# Patient Record
Sex: FEMALE | Race: WHITE
[De-identification: names, ages, dates, MRNs, and addresses within clinical notes are randomized per-mention and may not be internally consistent; named-entity substitution may affect disease eponyms.]

---

## 2017-05-25 PROBLEM — Z00.00 ENCOUNTER FOR PREVENTIVE HEALTH EXAMINATION: Status: ACTIVE | Noted: 2017-05-25

## 2017-06-23 ENCOUNTER — APPOINTMENT (OUTPATIENT)
Dept: NEUROLOGY | Facility: CLINIC | Age: 82
End: 2017-06-23

## 2017-06-23 VITALS — HEART RATE: 80 BPM | SYSTOLIC BLOOD PRESSURE: 194 MMHG | DIASTOLIC BLOOD PRESSURE: 81 MMHG

## 2017-06-23 DIAGNOSIS — I10 ESSENTIAL (PRIMARY) HYPERTENSION: ICD-10-CM

## 2017-06-23 DIAGNOSIS — Z82.0 FAMILY HISTORY OF EPILEPSY AND OTHER DISEASES OF THE NERVOUS SYSTEM: ICD-10-CM

## 2017-06-23 RX ORDER — ATENOLOL 50 MG/1
50 TABLET ORAL
Qty: 180 | Refills: 0 | Status: ACTIVE | COMMUNITY
Start: 2017-02-22

## 2017-06-23 RX ORDER — ALPRAZOLAM 1 MG/1
1 TABLET ORAL
Qty: 120 | Refills: 0 | Status: ACTIVE | COMMUNITY
Start: 2017-05-31

## 2017-06-23 RX ORDER — DICLOFENAC SODIUM 10 MG/G
1 GEL TOPICAL
Qty: 100 | Refills: 0 | Status: ACTIVE | COMMUNITY
Start: 2017-05-10

## 2017-09-22 ENCOUNTER — MEDICATION RENEWAL (OUTPATIENT)
Age: 82
End: 2017-09-22

## 2017-10-17 ENCOUNTER — APPOINTMENT (OUTPATIENT)
Dept: NEUROLOGY | Facility: CLINIC | Age: 82
End: 2017-10-17
Payer: MEDICARE

## 2017-10-17 VITALS
SYSTOLIC BLOOD PRESSURE: 181 MMHG | WEIGHT: 148 LBS | HEIGHT: 64 IN | BODY MASS INDEX: 25.27 KG/M2 | DIASTOLIC BLOOD PRESSURE: 84 MMHG | HEART RATE: 72 BPM

## 2017-10-17 DIAGNOSIS — R39.15 URGENCY OF URINATION: ICD-10-CM

## 2017-10-17 PROCEDURE — 99214 OFFICE O/P EST MOD 30 MIN: CPT

## 2017-10-17 RX ORDER — TROSPIUM CHLORIDE 20 MG/1
20 TABLET, FILM COATED ORAL TWICE DAILY
Qty: 60 | Refills: 5 | Status: ACTIVE | COMMUNITY
Start: 2017-10-17 | End: 1900-01-01

## 2017-11-03 ENCOUNTER — MEDICATION RENEWAL (OUTPATIENT)
Age: 82
End: 2017-11-03

## 2017-11-13 ENCOUNTER — MEDICATION RENEWAL (OUTPATIENT)
Age: 82
End: 2017-11-13

## 2018-03-16 ENCOUNTER — APPOINTMENT (OUTPATIENT)
Dept: NEUROLOGY | Facility: CLINIC | Age: 83
End: 2018-03-16
Payer: MEDICARE

## 2018-03-16 VITALS
HEIGHT: 64 IN | DIASTOLIC BLOOD PRESSURE: 82 MMHG | HEART RATE: 72 BPM | BODY MASS INDEX: 24.92 KG/M2 | WEIGHT: 146 LBS | SYSTOLIC BLOOD PRESSURE: 180 MMHG

## 2018-03-16 PROCEDURE — 99214 OFFICE O/P EST MOD 30 MIN: CPT

## 2018-09-21 ENCOUNTER — APPOINTMENT (OUTPATIENT)
Dept: NEUROLOGY | Facility: CLINIC | Age: 83
End: 2018-09-21
Payer: MEDICARE

## 2018-09-21 PROCEDURE — 99214 OFFICE O/P EST MOD 30 MIN: CPT

## 2019-03-27 ENCOUNTER — APPOINTMENT (OUTPATIENT)
Dept: NEUROLOGY | Facility: CLINIC | Age: 84
End: 2019-03-27
Payer: MEDICARE

## 2019-03-27 PROCEDURE — 99214 OFFICE O/P EST MOD 30 MIN: CPT

## 2019-03-27 NOTE — PHYSICAL EXAM
[Person] : oriented to person [Place] : oriented to place [Time] : oriented to time [Naming Objects] : no difficulty naming common objects [Repeating Phrases] : no difficulty repeating a phrase [Writing A Sentence] : no difficulty writing a sentence [Fluency] : fluency intact [Comprehension] : comprehension intact [Reading] : reading intact [Cranial Nerves Optic (II)] : visual acuity intact bilaterally,  visual fields full to confrontation, pupils equal round and reactive to light [Cranial Nerves Oculomotor (III)] : extraocular motion intact [Cranial Nerves Trigeminal (V)] : facial sensation intact symmetrically [Cranial Nerves Facial (VII)] : face symmetrical [Cranial Nerves Vestibulocochlear (VIII)] : hearing was intact bilaterally [Cranial Nerves Glossopharyngeal (IX)] : tongue and palate midline [Cranial Nerves Accessory (XI - Cranial And Spinal)] : head turning and shoulder shrug symmetric [Cranial Nerves Hypoglossal (XII)] : there was no tongue deviation with protrusion [Motor Strength] : muscle strength was normal in all four extremities [Motor Handedness Right-Handed] : the patient is right hand dominant [Sensation Tactile Decrease] : light touch was intact [Sensation Vibration Decrease] : vibration was intact [Proprioception] : proprioception was intact [Dysdiadochokinesia Bilaterally] : not present [Coordination - Dysmetria Impaired Finger-to-Nose Bilateral] : not present [Coordination - Dysmetria Impaired Heel-to-Shin Bilateral] : not present [2+] : Brachioradialis left 2+ [1+] : Ankle jerk left 1+ [Plantar Reflex Right Only] : normal on the right [Plantar Reflex Left Only] : normal on the left [FreeTextEntry1] : Facial expression was mildly reduced, but voice was normal. Extraocular movements were intact except mildly impaired upgaze with normal saccade, smooth pursuit, and no square wave jerks seen. Tone was mildly increased neck and minimally (in left arm). Rapid alternating movements and foot tap were slightly worse on the left. 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 Cannot standup steadily without pushing with arms. Gait is wide-based with shortened stride and (improved) freezing of gait, especially on turns. Turns from multistep. Pull test negative. There was no tremor.

## 2019-03-27 NOTE — HISTORY OF PRESENT ILLNESS
[FreeTextEntry1] : The patient is an 81-year-old right-handed woman who comes in to be evaluated for falling and gait hesitation. She has fallen 5 times since September of 2016. Twice in September, but when she fell backwards, twice at night  in February when she was taking Xanax, and once recently when she was trying to sit on the toilet quickly and misjudged the seat.\par She has no speech changes, but does have decreased facial expression. She has no drooling or dysphagia. Her handwriting has declined.  She has no trouble turning over in bed, no history of acting out her dreams. Walking and balance have gotten worse. She is mildly forgetful, and has no depression or hallucinations. She has constipation, which she treats with Metamucil and Colace. Her sense of smell is decreased. She was evaluated last year by a movement disorders specialist (Dr Anson Swenson). At that time, an MRI was recommended, which showed mild white matter disease, but no hydrocephalus. A Eugene was discussed, but never performed. \par \par 1. Had heart attack in October 2018, got stent\par 2. Did start sinemet 2.5 tabs tid and felt fatigued so went down, now back at 2.5 tabs. She feels about the same as last visit, except more hesitation and retropulsion. Right knee hurts today. 2 falls at home and one in parking lot.\par 2. Urinary issues persist. Saw Urologist started oxybutinin which gave her too many side affects. No current meds.\par 3. Sleeps well, no RBD. Wakes up 2x/night to urinate\par 4. Exercises and gets PT ([facility] in [place], she likes it)\par \par \par \par \par \par \par

## 2019-03-27 NOTE — DISCUSSION/SUMMARY
[FreeTextEntry1] : In summary, the patient is a 81-year-old right-handed woman with a gait disorder of unknown etiology. Examination is significant for mild symmetric rigidity and perhaps slowing (which was slightly asymmetric), and significant issues with balance, walking, and freezing of gait. She had no tremor.\par Overall, the history and examination is not entirely consistent with a diagnosis of idiopathic Parkinson's disease. Likewise, neither the exam or the imaging suggest vascular parkinsonism or normal pressure hydrocephalus. Seems to be primary freezing of gait. She is better on levodopa\par \par 1. Continue sinemet, 2.5 pills 25/100 TID, trial of 25/250, if she has more side effects from lower carbidopa, can go back to 25/100\par 2. PT again, OT\par

## 2019-06-21 ENCOUNTER — MEDICATION RENEWAL (OUTPATIENT)
Age: 84
End: 2019-06-21

## 2019-07-26 ENCOUNTER — APPOINTMENT (OUTPATIENT)
Dept: NEUROLOGY | Facility: CLINIC | Age: 84
End: 2019-07-26
Payer: MEDICARE

## 2019-07-26 VITALS — DIASTOLIC BLOOD PRESSURE: 74 MMHG | SYSTOLIC BLOOD PRESSURE: 160 MMHG

## 2019-07-26 DIAGNOSIS — G20 PARKINSON'S DISEASE: ICD-10-CM

## 2019-07-26 DIAGNOSIS — R26.89 OTHER ABNORMALITIES OF GAIT AND MOBILITY: ICD-10-CM

## 2019-07-26 PROCEDURE — 99214 OFFICE O/P EST MOD 30 MIN: CPT

## 2019-07-26 NOTE — PHYSICAL EXAM
[Person] : oriented to person [Time] : oriented to time [Place] : oriented to place [Naming Objects] : no difficulty naming common objects [Fluency] : fluency intact [Cranial Nerves Optic (II)] : visual acuity intact bilaterally,  visual fields full to confrontation, pupils equal round and reactive to light [Cranial Nerves Facial (VII)] : face symmetrical [Cranial Nerves Oculomotor (III)] : extraocular motion intact [Cranial Nerves Trigeminal (V)] : facial sensation intact symmetrically [Cranial Nerves Glossopharyngeal (IX)] : tongue and palate midline [Cranial Nerves Accessory (XI - Cranial And Spinal)] : head turning and shoulder shrug symmetric [Cranial Nerves Vestibulocochlear (VIII)] : hearing was intact bilaterally [Cranial Nerves Hypoglossal (XII)] : there was no tongue deviation with protrusion [Motor Strength] : muscle strength was normal in all four extremities [Motor Handedness Right-Handed] : the patient is right hand dominant [Sensation Tactile Decrease] : light touch was intact [Proprioception] : proprioception was intact [Sensation Vibration Decrease] : vibration was intact [2+] : Brachioradialis left 2+ [1+] : Ankle jerk right 1+ [Short Term Intact] : short term memory intact [Registration Intact] : recent registration memory intact [Dysdiadochokinesia Bilaterally] : not present [Plantar Reflex Right Only] : normal on the right [Coordination - Dysmetria Impaired Heel-to-Shin Bilateral] : not present [Coordination - Dysmetria Impaired Finger-to-Nose Bilateral] : not present [FreeTextEntry1] : Facial expression was mildly reduced, but voice was normal. Extraocular movements were intact except mildly impaired upgaze with normal saccade, smooth pursuit, and no square wave jerks seen. Tone was mildly increased neck and minimally (in left arm). Rapid alternating movements and foot tap were slightly worse on the left. 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 Cannot standup steadily without pushing with arms. Gait is wide-based with shortened stride and (improved) freezing of gait, especially on turns. Turns from multistep. Pull test positive. There was no tremor. [Plantar Reflex Left Only] : normal on the left

## 2019-07-26 NOTE — DISCUSSION/SUMMARY
[FreeTextEntry1] : In summary, the patient is a 81-year-old right-handed woman with a gait disorder of unknown etiology. Examination is significant for mild symmetric rigidity and perhaps slowing (which was slightly asymmetric), and significant issues with balance, walking, and freezing of gait. She had no tremor.\par Overall, the history and examination is not entirely consistent with a diagnosis of idiopathic Parkinson's disease. Likewise, neither the exam or the imaging suggest vascular parkinsonism or normal pressure hydrocephalus. Seems to be primary freezing of gait. She is better on levodopa\par \par 1. Continue sinemet, 2.5 pills 25/100 TID, can try to dose as 2 qid (11-3-7-11)\par 2. PT again, OT\par

## 2019-07-26 NOTE — HISTORY OF PRESENT ILLNESS
[FreeTextEntry1] : The patient is an 83-year-old right-handed woman with parkinsonism and freezing of gait. Also had heart attack in October 2018, got stent\par \par 1. Pulled muscle in right back Did start sinemet 2.5 tabs tid and felt fatigued so went down, now back at 2.5 tabs (11-5-11). Tried 25/250, felt worse, went back to 25/100. She feels about the same as last visit, except more hesitation and freezing and retropulsion.\par 2. Urinary issues persist. Saw Urologist started oxybutynin which gave her too many side affects. No current meds.\par 3. Sleeps well with xanax, no RBD. Wakes up 2x/night to urinate. Mood is good, memory ok. \par 4. Exercises and gets PT ([facility] in [place], she likes it)\par \par \par \par \par \par \par

## 2019-08-22 ENCOUNTER — MEDICATION RENEWAL (OUTPATIENT)
Age: 84
End: 2019-08-22

## 2019-10-09 ENCOUNTER — APPOINTMENT (OUTPATIENT)
Dept: NEUROLOGY | Facility: CLINIC | Age: 84
End: 2019-10-09

## 2019-10-15 RX ORDER — RASAGILINE 1 MG/1
1 TABLET ORAL
Qty: 30 | Refills: 5 | Status: ACTIVE | COMMUNITY
Start: 2019-10-15 | End: 1900-01-01

## 2020-02-12 ENCOUNTER — APPOINTMENT (OUTPATIENT)
Dept: NEUROLOGY | Facility: CLINIC | Age: 85
End: 2020-02-12

## 2020-04-01 ENCOUNTER — APPOINTMENT (OUTPATIENT)
Dept: NEUROLOGY | Facility: CLINIC | Age: 85
End: 2020-04-01
Payer: MEDICARE

## 2020-04-01 ENCOUNTER — APPOINTMENT (OUTPATIENT)
Dept: NEUROLOGY | Facility: CLINIC | Age: 85
End: 2020-04-01

## 2020-04-01 PROCEDURE — G2012 BRIEF CHECK IN BY MD/QHP: CPT

## 2020-08-11 RX ORDER — CARBIDOPA AND LEVODOPA 25; 100 MG/1; MG/1
25-100 TABLET ORAL
Qty: 225 | Refills: 5 | Status: ACTIVE | COMMUNITY
Start: 2017-06-23 | End: 1900-01-01

## 2021-11-28 ENCOUNTER — EMERGENCY (EMERGENCY)
Facility: HOSPITAL | Age: 86
LOS: 0 days | Discharge: HOME | End: 2021-11-28
Attending: EMERGENCY MEDICINE
Payer: MEDICARE

## 2021-11-28 VITALS
RESPIRATION RATE: 18 BRPM | HEART RATE: 83 BPM | WEIGHT: 149.91 LBS | OXYGEN SATURATION: 99 % | TEMPERATURE: 98 F | HEIGHT: 63 IN | SYSTOLIC BLOOD PRESSURE: 157 MMHG | DIASTOLIC BLOOD PRESSURE: 71 MMHG

## 2021-11-28 VITALS
SYSTOLIC BLOOD PRESSURE: 148 MMHG | OXYGEN SATURATION: 99 % | TEMPERATURE: 99 F | DIASTOLIC BLOOD PRESSURE: 63 MMHG | RESPIRATION RATE: 14 BRPM | HEART RATE: 64 BPM
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
## 2021-11-28 DIAGNOSIS — Z86.2 PERSONAL HISTORY OF DISEASES OF THE BLOOD AND BLOOD-FORMING ORGANS AND CERTAIN DISORDERS INVOLVING THE IMMUNE MECHANISM: ICD-10-CM

## 2021-11-28 DIAGNOSIS — Z79.01 LONG TERM (CURRENT) USE OF ANTICOAGULANTS: ICD-10-CM

## 2021-11-28 DIAGNOSIS — G20 PARKINSON'S DISEASE: ICD-10-CM

## 2021-11-28 DIAGNOSIS — I25.10 ATHEROSCLEROTIC HEART DISEASE OF NATIVE CORONARY ARTERY WITHOUT ANGINA PECTORIS: ICD-10-CM

## 2021-11-28 DIAGNOSIS — I10 ESSENTIAL (PRIMARY) HYPERTENSION: ICD-10-CM

## 2021-11-28 DIAGNOSIS — S09.90XA UNSPECIFIED INJURY OF HEAD, INITIAL ENCOUNTER: ICD-10-CM

## 2021-11-28 DIAGNOSIS — E87.1 HYPO-OSMOLALITY AND HYPONATREMIA: ICD-10-CM
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
## 2021-11-28 LAB
ALBUMIN SERPL ELPH-MCNC: 3.4 G/DL — SIGNIFICANT CHANGE UP (ref 3.3–5)
ALP SERPL-CCNC: 79 U/L — SIGNIFICANT CHANGE UP (ref 40–120)
ALT FLD-CCNC: 10 U/L — LOW (ref 12–78)
ANION GAP SERPL CALC-SCNC: 6 MMOL/L — SIGNIFICANT CHANGE UP (ref 5–17)
APPEARANCE UR: CLEAR — SIGNIFICANT CHANGE UP
APTT BLD: 27.5 SEC — SIGNIFICANT CHANGE UP (ref 27.5–35.5)
AST SERPL-CCNC: 16 U/L — SIGNIFICANT CHANGE UP (ref 15–37)
BASOPHILS # BLD AUTO: 0.07 K/UL — SIGNIFICANT CHANGE UP (ref 0–0.2)
BASOPHILS NFR BLD AUTO: 0.6 % — SIGNIFICANT CHANGE UP (ref 0–2)
BILIRUB SERPL-MCNC: 0.5 MG/DL — SIGNIFICANT CHANGE UP (ref 0.2–1.2)
BILIRUB UR-MCNC: NEGATIVE — SIGNIFICANT CHANGE UP
BUN SERPL-MCNC: 15 MG/DL — SIGNIFICANT CHANGE UP (ref 7–23)
CALCIUM SERPL-MCNC: 9 MG/DL — SIGNIFICANT CHANGE UP (ref 8.5–10.1)
CHLORIDE SERPL-SCNC: 94 MMOL/L — LOW (ref 96–108)
CO2 SERPL-SCNC: 31 MMOL/L — SIGNIFICANT CHANGE UP (ref 22–31)
COLOR SPEC: YELLOW — SIGNIFICANT CHANGE UP
CREAT SERPL-MCNC: 0.54 MG/DL — SIGNIFICANT CHANGE UP (ref 0.5–1.3)
DIFF PNL FLD: ABNORMAL
EOSINOPHIL # BLD AUTO: 0.1 K/UL — SIGNIFICANT CHANGE UP (ref 0–0.5)
EOSINOPHIL NFR BLD AUTO: 0.8 % — SIGNIFICANT CHANGE UP (ref 0–6)
GLUCOSE SERPL-MCNC: 93 MG/DL — SIGNIFICANT CHANGE UP (ref 70–99)
GLUCOSE UR QL: 50 MG/DL
HCT VFR BLD CALC: 39.7 % — SIGNIFICANT CHANGE UP (ref 34.5–45)
HGB BLD-MCNC: 13.7 G/DL — SIGNIFICANT CHANGE UP (ref 11.5–15.5)
IMM GRANULOCYTES NFR BLD AUTO: 0.5 % — SIGNIFICANT CHANGE UP (ref 0–1.5)
INR BLD: 1.01 RATIO — SIGNIFICANT CHANGE UP (ref 0.88–1.16)
KETONES UR-MCNC: ABNORMAL
LEUKOCYTE ESTERASE UR-ACNC: NEGATIVE — SIGNIFICANT CHANGE UP
LYMPHOCYTES # BLD AUTO: 1.24 K/UL — SIGNIFICANT CHANGE UP (ref 1–3.3)
LYMPHOCYTES # BLD AUTO: 9.9 % — LOW (ref 13–44)
MCHC RBC-ENTMCNC: 31 PG — SIGNIFICANT CHANGE UP (ref 27–34)
MCHC RBC-ENTMCNC: 34.5 GM/DL — SIGNIFICANT CHANGE UP (ref 32–36)
MCV RBC AUTO: 89.8 FL — SIGNIFICANT CHANGE UP (ref 80–100)
MONOCYTES # BLD AUTO: 0.98 K/UL — HIGH (ref 0–0.9)
MONOCYTES NFR BLD AUTO: 7.8 % — SIGNIFICANT CHANGE UP (ref 2–14)
NEUTROPHILS # BLD AUTO: 10.11 K/UL — HIGH (ref 1.8–7.4)
NEUTROPHILS NFR BLD AUTO: 80.4 % — HIGH (ref 43–77)
NITRITE UR-MCNC: NEGATIVE — SIGNIFICANT CHANGE UP
PH UR: 7 — SIGNIFICANT CHANGE UP (ref 5–8)
PLATELET # BLD AUTO: 258 K/UL — SIGNIFICANT CHANGE UP (ref 150–400)
POTASSIUM SERPL-MCNC: 3.6 MMOL/L — SIGNIFICANT CHANGE UP (ref 3.5–5.3)
POTASSIUM SERPL-SCNC: 3.6 MMOL/L — SIGNIFICANT CHANGE UP (ref 3.5–5.3)
PROT SERPL-MCNC: 6.7 GM/DL — SIGNIFICANT CHANGE UP (ref 6–8.3)
PROT UR-MCNC: 15 MG/DL
PROTHROM AB SERPL-ACNC: 11.8 SEC — SIGNIFICANT CHANGE UP (ref 10.6–13.6)
RBC # BLD: 4.42 M/UL — SIGNIFICANT CHANGE UP (ref 3.8–5.2)
RBC # FLD: 13.5 % — SIGNIFICANT CHANGE UP (ref 10.3–14.5)
SODIUM SERPL-SCNC: 131 MMOL/L — LOW (ref 135–145)
SP GR SPEC: 1.01 — SIGNIFICANT CHANGE UP (ref 1.01–1.02)
UROBILINOGEN FLD QL: NEGATIVE MG/DL — SIGNIFICANT CHANGE UP
WBC # BLD: 12.56 K/UL — HIGH (ref 3.8–10.5)
WBC # FLD AUTO: 12.56 K/UL — HIGH (ref 3.8–10.5)

## 2021-11-28 PROCEDURE — 99284 EMERGENCY DEPT VISIT MOD MDM: CPT | Mod: 25

## 2021-11-28 PROCEDURE — 80053 COMPREHEN METABOLIC PANEL: CPT

## 2021-11-28 PROCEDURE — 36415 COLL VENOUS BLD VENIPUNCTURE: CPT

## 2021-11-28 PROCEDURE — 70450 CT HEAD/BRAIN W/O DYE: CPT | Mod: MA

## 2021-11-28 PROCEDURE — 72125 CT NECK SPINE W/O DYE: CPT | Mod: MA

## 2021-11-28 PROCEDURE — 86901 BLOOD TYPING SEROLOGIC RH(D): CPT

## 2021-11-28 PROCEDURE — 72170 X-RAY EXAM OF PELVIS: CPT | Mod: 26

## 2021-11-28 PROCEDURE — 72170 X-RAY EXAM OF PELVIS: CPT

## 2021-11-28 PROCEDURE — 86850 RBC ANTIBODY SCREEN: CPT

## 2021-11-28 PROCEDURE — 81001 URINALYSIS AUTO W/SCOPE: CPT

## 2021-11-28 PROCEDURE — 87086 URINE CULTURE/COLONY COUNT: CPT

## 2021-11-28 PROCEDURE — 70450 CT HEAD/BRAIN W/O DYE: CPT | Mod: 26,MA

## 2021-11-28 PROCEDURE — 99284 EMERGENCY DEPT VISIT MOD MDM: CPT

## 2021-11-28 PROCEDURE — 85025 COMPLETE CBC W/AUTO DIFF WBC: CPT

## 2021-11-28 PROCEDURE — 85730 THROMBOPLASTIN TIME PARTIAL: CPT

## 2021-11-28 PROCEDURE — 85610 PROTHROMBIN TIME: CPT

## 2021-11-28 PROCEDURE — 72125 CT NECK SPINE W/O DYE: CPT | Mod: 26,MA

## 2021-11-28 PROCEDURE — 86900 BLOOD TYPING SEROLOGIC ABO: CPT

## 2021-11-28 RX ORDER — SODIUM CHLORIDE 9 MG/ML
3 INJECTION INTRAMUSCULAR; INTRAVENOUS; SUBCUTANEOUS ONCE
Refills: 0 | Status: COMPLETED | OUTPATIENT
Start: 2021-11-28 | End: 2021-11-28

## 2021-11-28 RX ADMIN — SODIUM CHLORIDE 3 MILLILITER(S): 9 INJECTION INTRAMUSCULAR; INTRAVENOUS; SUBCUTANEOUS at 13:09

## 2021-11-28 NOTE — ED ADULT TRIAGE NOTE - CHIEF COMPLAINT QUOTE
Pt BIBEMS from [facility] s/p trip and fall. pt reports "losing balance and falling back hitting head, with no LOC". no obvious head injury. pt on Plavix. pt taken directly to CT scan, neuro alert. pt in view of nursing station, fall precautions in place.

## 2021-11-28 NOTE — ED PROVIDER NOTE - GASTROINTESTINAL, MLM
Abd soft, non tender, bowel sounds hypoactive, nml pitch Abd soft, non tender, bowel sounds positive.

## 2021-11-28 NOTE — ED ADULT NURSE REASSESSMENT NOTE - NS ED NURSE REASSESS COMMENT FT1
pt undressed and repositioned for comfort at this time. purewick placed for incontinence. daughter remains at bedside. awaiting results.

## 2021-11-28 NOTE — ED PROVIDER NOTE - OBJECTIVE STATEMENT
87 y/o F w/ PMHx of on Plavix presents to ED BIBEMS from [facility] c/o head injury s/p trip and fall. Pt reports losing balance and falling backwards striking head on ground. 85 y/o F w/ PMHx of HTN CAD on Plavix, parkinson's on sinemet, anxiety, iron deficient anemia presents to ED BIBEMS from [facility] c/o head injury s/p trip and fall. Pt reports losing balance while stepping backwards causing her to fall backwards striking head on ground this morning. Pt ambulates w/ walker at baseline. Denies LOC or head pain. As per daughter pt has increased b/l extremities swelling since mid October. Pt is COVID vaccinated. 85 y/o F w/ PMHx of HTN CAD on Plavix, parkinson's on sinemet, anxiety, iron deficient anemia presents to ED BIBEMS from [facility] c/o head injury s/p trip and fall. Pt reports losing balance while stepping backwards w/o walker assistance, causing her to fall backwards striking head on ground this morning. Pt ambulates w/ walker at baseline. Denies LOC or head pain. Denies any pain to neck, back, extremities, no N/V, vision/speech change, B/B new changes.  As per daughter pt has mildly increased b/l extremities swelling since mid October. Pt is COVID vaccinated.

## 2021-11-28 NOTE — ED PROVIDER NOTE - EYES, MLM
Clear bilaterally, pupils equal, round and reactive to light. Clear bilaterally, pupils equal, round and reactive to light. EOMI, (-) raccoons,

## 2021-11-28 NOTE — ED PROVIDER NOTE - CLINICAL SUMMARY MEDICAL DECISION MAKING FREE TEXT BOX
87 y/o F w/ PMHx of HTN CAD on Plavix, parkinson's on Sinemet, anxiety, iron deficiency, anemia presents to ED BIBEMS from [facility] c/o head injury s/p trip and fall. Pt reports losing balance while stepping backwards w/o walker assistance causing her to fall back striking head on ground this morning. Plan neuro alert, CT head/ spine and XR pelvis, fall precautions. Daughter concerned iron deficiency anemia not responding to iron supplement request labs; CBC, chemistry CMP. Monitor, observe, reassess

## 2021-11-28 NOTE — ED PROVIDER NOTE - MUSCULOSKELETAL, MLM
MAEx4, no focal swelling tenderness MAEx4, no focal tenderness or deformity, neck nontender supple w/o pain, back, pelvis, chest wall nontender stable, b/l SLR 35 degrees w/ good motor no pain

## 2021-11-28 NOTE — ED PROVIDER NOTE - CARE PLAN
Principal Discharge DX:	Head injury, closed, without LOC  Secondary Diagnosis:	Parkinson's disease  Secondary Diagnosis:	Fall from standing, initial encounter  Secondary Diagnosis:	Hyponatremia   1

## 2021-11-28 NOTE — ED PROVIDER NOTE - SKIN, MLM
Skin normal color for race, warm, dry and intact. No evidence of rash. Skin normal color for race, warm, dry and intact. No evidence of rash. no tactile warmth no obvious signs of evidence of trauma.

## 2021-11-28 NOTE — ED PROVIDER NOTE - RESPIRATORY, MLM
Breath sounds clear and equal bilaterally. Breath sounds clear and equal bilaterally. respiration normal

## 2021-11-28 NOTE — ED PROVIDER NOTE - PATIENT PORTAL LINK FT
You can access the FollowMyHealth Patient Portal offered by [facility] by registering at the following website: [email or web address]. By joining [employer]’s FollowMyHealth portal, you will also be able to view your health information using other applications (apps) compatible with our system.

## 2021-11-28 NOTE — ED ADULT NURSE NOTE - NSIMPLEMENTINTERV_GEN_ALL_ED
Implemented All Fall with Harm Risk Interventions:  [place] to call system. Call bell, personal items and telephone within reach. Instruct patient to call for assistance. Room bathroom lighting operational. Non-slip footwear when patient is off stretcher. Physically safe environment: no spills, clutter or unnecessary equipment. Stretcher in lowest position, wheels locked, appropriate side rails in place. Provide visual cue, wrist band, yellow gown, etc. Monitor gait and stability. Monitor for mental status changes and reorient to person, place, and time. Review medications for side effects contributing to fall risk. Reinforce activity limits and safety measures with patient and family. Provide visual clues: red socks.

## 2021-11-28 NOTE — ED ADULT NURSE NOTE - OBJECTIVE STATEMENT
pt presents to Ed with complaints of fall at [facility]. pt states "I fell backwards and hit my head." pt denies LOC but endorses being on plavix. pt reports she lost her balance when she fell.  pt was sent straight to CT by EMS RN for neuro alert protocol. pt a&o at this time. no other complaints at this time. pt presents to Ed with complaints of fall at [facility]. pt states "I fell backwards and hit my head." pt denies LOC but endorses being on plavix. pt reports she lost her balance when she fell.  pt states "I wasn't dizzy before I fell, but I was when I was after the fall". pt denies dizziness at this time. pt was sent straight to CT by EMS RN for neuro alert protocol. pt a&o at this time. no other complaints at this time.

## 2021-11-28 NOTE — ED PROVIDER NOTE - NSFOLLOWUPINSTRUCTIONS_ED_ALL_ED_FT
Continue regular medications as per routine.  CTs head & c-spine: no acute traumatic pathology.  Fall precautions.  ALWAYS use walker for ambulation assistance.  Consider follow-up blood draw of electrolytes (mild hyponatremia noted).  CT c-spine + heterogenous thyroid: consider dedicated thyroid ultrasound.    Closed Head Injury    A closed head injury is an injury to your head that may or may not involve a traumatic brain injury (TBI). Symptoms of TBI can be short or long lasting and include headache, dizziness, interference with memory or speech, fatigue, confusion, changes in sleep, mood changes, nausea, depression/anxiety, and dulling of senses. Make sure to obtain proper rest which includes getting plenty of sleep, avoiding excessive visual stimulation, and avoiding activities that may cause physical or mental stress. Avoid any situation where there is potential for another head injury, including sports.    SEEK IMMEDIATE MEDICAL CARE IF YOU HAVE ANY OF THE FOLLOWING SYMPTOMS: unusual drowsiness, vomiting, severe dizziness, seizures, lightheadedness, muscular weakness, different pupil sizes, visual changes, or clear or bloody discharge from your ears or nose.          Fall Prevention in the Home, Adult  Falls can cause injuries. They can happen to people of all ages. There are many things you can do to make your home safe and to help prevent falls. Ask for help when making these changes, if needed.  What actions can I take to prevent falls?  General Instructions     Use good lighting in all rooms. Replace any light bulbs that burn out.Turn on the lights when you go into a dark area. Use night-lights.Keep items that you use often in easy-to-reach places. Lower the shelves around your home if necessary.Set up your furniture so you have a clear path. Avoid moving your furniture around.Do not have throw rugs and other things on the floor that can make you trip.Avoid walking on wet floors.If any of your floors are uneven, fix them.Add color or contrast paint or tape to clearly teja and help you see:  Any grab bars or handrails.First and last steps of stairways.Where the edge of each step is.If you use a stepladder:  Make sure that it is fully opened. Do not climb a closed stepladder.Make sure that both sides of the stepladder are locked into place.Ask someone to hold the stepladder for you while you use it.If there are any pets around you, be aware of where they are.What can I do in the bathroom?         Keep the floor dry. Clean up any water that spills onto the floor as soon as it happens.Remove soap buildup in the tub or shower regularly.Use non-skid mats or decals on the floor of the tub or shower.Attach bath mats securely with double-sided, non-slip rug tape.If you need to sit down in the shower, use a plastic, non-slip stool.Install grab bars by the toilet and in the tub and shower. Do not use towel bars as grab bars.What can I do in the bedroom?     Make sure that you have a light by your bed that is easy to reach.Do not use any sheets or blankets that are too big for your bed. They should not hang down onto the floor.Have a firm chair that has side arms. You can use this for support while you get dressed.What can I do in the kitchen?     Clean up any spills right away.If you need to reach something above you, use a strong step stool that has a grab bar.Keep electrical cords out of the way.Do not use floor polish or wax that makes floors slippery. If you must use wax, use non-skid floor wax.What can I do with my stairs?     Do not leave any items on the stairs.Make sure that you have a light switch at the top of the stairs and the bottom of the stairs. If you do not have them, ask someone to add them for you.Make sure that there are handrails on both sides of the stairs, and use them. Fix handrails that are broken or loose. Make sure that handrails are as long as the stairways.Install non-slip stair treads on all stairs in your home.Avoid having throw rugs at the top or bottom of the stairs. If you do have throw rugs, attach them to the floor with carpet tape.Choose a carpet that does not hide the edge of the steps on the stairway.Check any carpeting to make sure that it is firmly attached to the stairs. Fix any carpet that is loose or worn.What can I do on the outside of my home?     Use bright outdoor lighting.Regularly fix the edges of walkways and driveways and fix any cracks.Remove anything that might make you trip as you walk through a door, such as a raised step or threshold.Trim any bushes or trees on the path to your home.Regularly check to see if handrails are loose or broken. Make sure that both sides of any steps have handrails.Install guardrails along the edges of any raised decks and porches.Clear walking paths of anything that might make someone trip, such as tools or rocks.Have any leaves, snow, or ice cleared regularly.Use sand or salt on walking paths during winter.Clean up any spills in your garage right away. This includes grease or oil spills.What other actions can I take?     Wear shoes that:  Have a low heel. Do not wear high heels.Have rubber bottoms.Are comfortable and fit you well.Are closed at the toe. Do not wear open-toe sandals.Use tools that help you move around (mobility aids) if they are needed. These include:  Canes.Walkers.Scooters.Crutches.Review your medicines with your doctor. Some medicines can make you feel dizzy. This can increase your chance of falling.Ask your doctor what other things you can do to help prevent falls.  Where to find more information  Centers for Disease Control and PreventionANDREW: https://cdc.govNational [place] on Aging: https://sv3ltnk.esperanza.nih.govContact a doctor if:  You are afraid of falling at home.You feel weak, drowsy, or dizzy at home.You fall at home.Summary  There are many simple things that you can do to make your home safe and to help prevent falls.Ways to make your home safe include removing tripping hazards and installing grab bars in the bathroom.Ask for help when making these changes in your home.This information is not intended to replace advice given to you by your health care provider. Make sure you discuss any questions you have with your health care provider.          Hyponatremia    WHAT YOU NEED TO KNOW:    Hyponatremia occurs when the amount of sodium (salt) in your blood is lower than normal. Sodium is an electrolyte (mineral) that helps your muscles, heart, and digestive system work properly. It helps control blood pressure and fluid balance.     DISCHARGE INSTRUCTIONS:    Follow up with your healthcare provider as directed: You may need to return for more tests. Write down your questions so you remember to ask them during your visits.    Nutrition: You may need to increase your intake of sodium. Foods that are high in sodium include milk, packaged snacks such as pretzels, or processed meats (kuo, sausage, and ham). Ask your dietitian to help you create a meal plan that is right for you.    Liquids: Follow your healthcare provider's advice if you need to limit the amount of liquid you drink. Ask how much liquid to drink each day and which liquids are best for you. You may be asked to drink liquids that have water, sugar, and salt, such as juices, milk, or sports drinks. These liquids help your body hold in fluid and prevent dehydration.     Contact your healthcare provider if:   •You have muscle cramps or twitching.      •You feel very weak or tired.      •You have nausea or are vomiting.      •You have questions or concerns about your condition or care.      Return to the emergency department if:   •You have a seizure.      •You have an irregular heartbeat.      •You have trouble breathing.      •You cannot move your arms and legs.      •You are confused or cannot think clearly.

## 2021-11-28 NOTE — ED PROVIDER NOTE - ENMT, MLM
Airway patent, Nasal mucosa clear. Mouth with normal mucosa. Throat has no vesicles, no oropharyngeal exudates and uvula is midline. oropharynx clear, mucous membranes mildly dry Airway patent, Nasal mucosa clear. Mouth with normal mucosa. Throat has no vesicles, no oropharyngeal exudates and uvula is midline. oropharynx clear, mucous membranes fairly moist, normocephalic atraumatic, (-) anna sign, no evidence of facial injury

## 2021-11-28 NOTE — ED PROVIDER NOTE - PROGRESS NOTE DETAILS
SUDHA Lemons MD:  Results of CTs, XR, labs reviewed & d/w daughter at bedside.  Pt stable for D/C back to A/L facility w/ fall precautions.

## 2021-11-28 NOTE — ED PROVIDER NOTE - NSICDXPASTMEDICALHX_GEN_ALL_CORE_FT
PAST MEDICAL HISTORY:  Anemia     CAD (coronary artery disease)     HTN (hypertension)     Parkinsons

## 2021-11-28 NOTE — ED PROVIDER NOTE - CONSTITUTIONAL, MLM
normal... alert, no respiratory distress, non toxic Elderly white female, alert, no respiratory distress, non toxic

## 2021-11-29 LAB
CULTURE RESULTS: SIGNIFICANT CHANGE UP
SPECIMEN SOURCE: SIGNIFICANT CHANGE UP

## 2022-01-10 ENCOUNTER — EMERGENCY (EMERGENCY)
Facility: HOSPITAL | Age: 87
LOS: 0 days | Discharge: HOME | End: 2022-01-10
Attending: EMERGENCY MEDICINE
Payer: MEDICARE

## 2022-01-10 VITALS
TEMPERATURE: 98 F | DIASTOLIC BLOOD PRESSURE: 78 MMHG | OXYGEN SATURATION: 100 % | RESPIRATION RATE: 17 BRPM | HEIGHT: 63 IN | WEIGHT: 156.97 LBS | SYSTOLIC BLOOD PRESSURE: 189 MMHG | HEART RATE: 65 BPM

## 2022-01-10 VITALS
DIASTOLIC BLOOD PRESSURE: 64 MMHG | HEART RATE: 70 BPM | OXYGEN SATURATION: 97 % | SYSTOLIC BLOOD PRESSURE: 169 MMHG | RESPIRATION RATE: 16 BRPM | TEMPERATURE: 98 F

## 2022-01-10 DIAGNOSIS — Z86.2 PERSONAL HISTORY OF DISEASES OF THE BLOOD AND BLOOD-FORMING ORGANS AND CERTAIN DISORDERS INVOLVING THE IMMUNE MECHANISM: ICD-10-CM

## 2022-01-10 DIAGNOSIS — Z79.02 LONG TERM (CURRENT) USE OF ANTITHROMBOTICS/ANTIPLATELETS: ICD-10-CM

## 2022-01-10 DIAGNOSIS — I10 ESSENTIAL (PRIMARY) HYPERTENSION: ICD-10-CM

## 2022-01-10 DIAGNOSIS — I25.10 ATHEROSCLEROTIC HEART DISEASE OF NATIVE CORONARY ARTERY WITHOUT ANGINA PECTORIS: ICD-10-CM

## 2022-01-10 DIAGNOSIS — G20 PARKINSON'S DISEASE: ICD-10-CM

## 2022-01-10 DIAGNOSIS — S09.90XA UNSPECIFIED INJURY OF HEAD, INITIAL ENCOUNTER: ICD-10-CM
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
## 2022-01-10 DIAGNOSIS — F02.80 DEMENTIA IN OTHER DISEASES CLASSIFIED ELSEWHERE, UNSPECIFIED SEVERITY, WITHOUT BEHAVIORAL DISTURBANCE, PSYCHOTIC DISTURBANCE, MOOD DISTURBANCE, AND ANXIETY: ICD-10-CM
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
## 2022-01-10 PROCEDURE — 99285 EMERGENCY DEPT VISIT HI MDM: CPT | Mod: 25

## 2022-01-10 PROCEDURE — 72125 CT NECK SPINE W/O DYE: CPT | Mod: 26,MA

## 2022-01-10 PROCEDURE — 72125 CT NECK SPINE W/O DYE: CPT | Mod: MA

## 2022-01-10 PROCEDURE — 70450 CT HEAD/BRAIN W/O DYE: CPT | Mod: 26,MA

## 2022-01-10 PROCEDURE — 70450 CT HEAD/BRAIN W/O DYE: CPT | Mod: MA

## 2022-01-10 PROCEDURE — 99284 EMERGENCY DEPT VISIT MOD MDM: CPT

## 2022-01-10 PROCEDURE — 99284 EMERGENCY DEPT VISIT MOD MDM: CPT | Mod: 25

## 2022-01-10 RX ORDER — ATENOLOL 25 MG/1
50 TABLET ORAL ONCE
Refills: 0 | Status: COMPLETED | OUTPATIENT
Start: 2022-01-10 | End: 2022-01-10

## 2022-01-10 RX ORDER — CARBIDOPA AND LEVODOPA 25; 100 MG/1; MG/1
2 TABLET ORAL ONCE
Refills: 0 | Status: COMPLETED | OUTPATIENT
Start: 2022-01-10 | End: 2022-01-10

## 2022-01-10 RX ORDER — LOSARTAN POTASSIUM 100 MG/1
25 TABLET, FILM COATED ORAL DAILY
Refills: 0 | Status: DISCONTINUED | OUTPATIENT
Start: 2022-01-10 | End: 2022-01-10

## 2022-01-10 RX ADMIN — ATENOLOL 50 MILLIGRAM(S): 25 TABLET ORAL at 22:22

## 2022-01-10 RX ADMIN — CARBIDOPA AND LEVODOPA 2 TABLET(S): 25; 100 TABLET ORAL at 22:22

## 2022-01-10 RX ADMIN — LOSARTAN POTASSIUM 25 MILLIGRAM(S): 100 TABLET, FILM COATED ORAL at 22:22

## 2022-01-10 NOTE — ED PROVIDER NOTE - OBJECTIVE STATEMENT
85 y/o female with PMHx of  HTN, CAD on Plavix, Parkinson's on sinemet, anxiety, iron deficient anemia, presents to the ED BIBA from [facility] s/p mechanical trip and fall. Pt states that she was getting a water bottle and lost her balance and fell. Denies loc, any pain, no n/v/d. Pt states family called for ambulance and brought her here. Nonsmoker, no illegal drugs, no social concerns.

## 2022-01-10 NOTE — ED PROVIDER NOTE - NSFOLLOWUPINSTRUCTIONS_ED_ALL_ED_FT
Please call and follow up with your doctor in 1-3 days.  Use Tylenol (acetaminophen) 1000mg every 6 hours  as need for pain.      Head Injury    WHAT YOU NEED TO KNOW:    A head injury is most often caused by a blow to the head. This may occur from a fall, bicycle injury, sports injury, being struck in the head, or a motor vehicle accident.     DISCHARGE INSTRUCTIONS:    Call 911 or have someone else call for any of the following:     You cannot be woken.      You have a seizure.      You stop responding to others or you faint.      You have blurry or double vision.      Your speech becomes slurred or confused.      You have arm or leg weakness, loss of feeling, or new problems with coordination.      Your pupils are larger than usual or one pupil is a different size than the other.       You have blood or clear fluid coming out of your ears or nose.    Return to the emergency department if:     You have repeated or forceful vomiting.      You feel confused.      Your headache gets worse or becomes severe.      You or someone caring for you notices that you are harder to wake than usual.    Contact your healthcare provider if:     Your symptoms last longer than 6 weeks after the injury.      You have questions or concerns about your condition or care.    Medicines:     Acetaminophen decreases pain. Acetaminophen is available without a doctor's order. Ask how much to take and how often to take it. Follow directions. Acetaminophen can cause liver damage if not taken correctly.      Take your medicine as directed. Contact your healthcare provider if you think your medicine is not helping or if you have side effects. Tell him or her if you are allergic to any medicine. Keep a list of the medicines, vitamins, and herbs you take. Include the amounts, and when and why you take them. Bring the list or the pill bottles to follow-up visits. Carry your medicine list with you in case of an emergency.    Self-care:     Rest or do quiet activities for 24 to 48 hours. Limit your time watching TV, using the computer, or doing tasks that require a lot of thinking. Slowly return to your normal activities as directed. Do not play sports or do activities that may cause you to get hit in the head. Ask your healthcare provider when you can return to sports.       Apply ice on your head for 15 to 20 minutes every hour or as directed. Use an ice pack, or put crushed ice in a plastic bag. Cover it with a towel before you apply it to your skin. Ice helps prevent tissue damage and decreases swelling and pain.       Have someone stay with you for 24 hours or as directed. This person can monitor you for complications and call 911. When you are awake the person should ask you a few questions to see if you are thinking clearly. An example would be to ask your name or your address.     Prevent another head injury:     Wear a helmet that fits properly. Do this when you play sports, or ride a bike, scooter, or skateboard. Helmets help decrease your risk of a serious head injury. Talk to your healthcare provider about other ways you can protect yourself if you play sports.      Wear your seat belt every time you are in a car. This helps to decrease your risk for a head injury if you are in a car accident.     Follow up with your healthcare provider as directed: Write down your questions so you remember to ask them during your visits.    Fall Prevention    WHAT YOU NEED TO KNOW:    Fall prevention includes ways to make your home and other areas safer. It also includes ways you can move more carefully to prevent a fall. Health conditions that cause changes in your blood pressure, vision, or muscle strength and coordination may increase your risk for falls. Medicines may also increase your risk for falls if they make you dizzy, weak, or sleepy.     DISCHARGE INSTRUCTIONS:    Call 911 or have someone else call if:     You have fallen and are unconscious.      You have fallen and cannot move part of your body.    Contact your healthcare provider if:     You have fallen and have pain or a headache.      You have questions or concerns about your condition or care.    Fall prevention tips:     Stand or sit up slowly. This may help you keep your balance and prevent falls.      Use assistive devices as directed. Your healthcare provider may suggest that you use a cane or walker to help you keep your balance. You may need to have grab bars put in your bathroom near the toilet or in the shower.      Wear shoes that fit well and have soles that . Wear shoes both inside and outside. Use slippers with good . Do not wear shoes with high heels.      Wear a personal alarm. This is a device that allows you to call 911 if you fall and need help. Ask your healthcare provider for more information.      Stay active. Exercise can help strengthen your muscles and improve your balance. Your healthcare provider may recommend water aerobics or walking. He or she may also recommend physical therapy to improve your coordination. Never start an exercise program without talking to your healthcare provider first.       Manage your medical conditions. Keep all appointments with your healthcare providers. Visit your eye doctor as directed.           Home safety tips:     Add items to prevent falls in the bathroom. Put nonslip strips on your bath or shower floor to prevent you from slipping. Use a bath mat if you do not have carpet in the bathroom. This will prevent you from falling when you step out of the bath or shower. Use a shower seat so you do not need to stand while you shower. Sit on the toilet or a chair in your bathroom to dry yourself and put on clothing. This will prevent you from losing your balance from drying or dressing yourself while you are standing.       Keep paths clear. Remove books, shoes, and other objects from walkways and stairs. Place cords for telephones and lamps out of the way so that you do not need to walk over them. Tape them down if you cannot move them. Remove small rugs. If you cannot remove a rug, secure it with double-sided tape. This will prevent you from tripping.       Install bright lights in your home. Use night lights to help light paths to the bathroom or kitchen. Always turn on the light before you start walking.      Keep items you use often on shelves within reach. Do not use a step stool to help you reach an item.      Paint or place reflective tape on the edges of your stairs. This will help you see the stairs better.    Follow up with your healthcare provider as directed: Write down your questions so you remember to ask them during your visits.

## 2022-01-10 NOTE — ED PROVIDER NOTE - PATIENT PORTAL LINK FT
You can access the FollowMyHealth Patient Portal offered by [facility] by registering at the following website: [email or web address]. By joining [facility]’s FollowMyHealth portal, you will also be able to view your health information using other applications (apps) compatible with our system.

## 2022-01-10 NOTE — ED ADULT NURSE REASSESSMENT NOTE - NS ED NURSE REASSESS COMMENT FT1
Received report from ELIUD Turner. Pt resting comfortably, resp. even and unlabored. Pt currently eating dinner. Awaiting transport back to nursing house. Denies any complaints. VS as noted. In NAD.

## 2022-01-10 NOTE — ED PROVIDER NOTE - CPE EDP RESP NORM
Dr. Jeremy Miller was advised of further questions. His comments were relayed to Augusto that surveillance with CT chest scan is the safest means of following her lungs. She can certainly see any other physician that she wishes, but he feels that Infectious Disease would have nothing to work with at this time. She expressed understanding, but admits to remaining concerned. normal...

## 2022-01-10 NOTE — ED PROVIDER NOTE - PROGRESS NOTE DETAILS
Lucian Rivero for attending Dr. Austin:  Called her daughter, Key Hernandez. Daughter reports pt had a fall. Pt was trying to put away something, lost balance and fell, hitting the back of her head. Pt with hx of parkinson's has episodes of difficulty with balance. Daughter is on the way to hospital. Attending Austin, Pt updated on results of ct scan.  Pt lives at [facility].  Plan ambulation trial and if pass can d/c.

## 2022-01-10 NOTE — ED ADULT NURSE NOTE - CHIEF COMPLAINT QUOTE
pt BIBA from [facility] s/p fall from standing height at 3p w/ head strike to back of head. +blood thinners. denies pain. hx- parkinsons. pt states she fell onto her buttocks first & then hit her head. denies LOC. neuro alert called.

## 2022-01-10 NOTE — ED ADULT TRIAGE NOTE - CHIEF COMPLAINT QUOTE
pt BIBA from [facility] s/p fall from standing height at 3p w/ head strike to back of head. +blood thinners. denies pain. hx- parkinsons. pt states she fell onto her but first & then hit her head. neuro alert calledl. pt BIBA from [facility] s/p fall from standing height at 3p w/ head strike to back of head. +blood thinners. denies pain. hx- parkinsons. pt states she fell onto her buttocks first & then hit her head. denies LOC. neuro alert called.

## 2022-01-10 NOTE — ED PROVIDER NOTE - MUSCULOSKELETAL, MLM
Spine appears normal, range of motion is not limited, no muscle or joint tenderness. No ttp to neck, nontender to extremities. Spine appears normal, range of motion is not limited, no muscle or joint tenderness. No ttp to neck with good ROM neck, nontender to extremities.

## 2022-01-10 NOTE — ED ADULT NURSE NOTE - OBJECTIVE STATEMENT
pt p/w c/o mechanical fall with head strike c/o mild generalized body pain.  NO obvious trauma to face, head, or scalp noted.  Pt at baseline mental status upon arrival to ED.

## 2022-01-10 NOTE — ED PROVIDER NOTE - CROS ED ROS STATEMENT
"Brief Medicine Note    Medicine assessed patient today in error. Medicine was consulted on a different patient on station 10N with same first name in rooms next to each other and provider was directed by staff to this patient's room in error. This was noted during examination, and correction was made immediately and patient notified.    Upon assessment, patient did disclose that he had some mild chest pressure that he related to \"anxiety,\" and stress. He states that he has this currently, but it comes and goes and has had this in the past. Denies pleuritic chest pain. No dyspnea or cough. No fever or chills. He also disclosed urinary frequency without burning. Denies history of STIs.    Today's vital signs, medications, and nursing notes were reviewed.     /71 (BP Location: Left arm)   Pulse 81   Temp 98.2  F (36.8  C) (Oral)   Resp 16   Ht 1.753 m (5' 9\")   Wt 74.5 kg (164 lb 3.2 oz)   SpO2 97%   BMI 24.25 kg/m    GENERAL: Alert and oriented x 3. Appears comfortable. Flat affect. Answering questions appropriately.   HEENT: Anicteric sclera. Mucous membranes moist.   CV: RRR. S1, S2. No murmurs appreciated.   RESPIRATORY: Effort normal on room air. Lungs CTAB with no wheezing, rales, rhonchi.   GI: Abdomen soft and non distended, bowel sounds present. No tenderness, rebound, guarding.     A/P:  # Atypical chest pain:  Likely related to anxiety. ECG on admission with sinus bradycardia. Will obtain ECG to compare to ensure no acute changes. Do not suspect PE. No underlying SOB, cough and lungs CTA.   - Addendum: ECG with NSR. No ST-T wave changes.     # Urinary frequency:  - UA/UCx ordered  - Denies STI history or concern   - Addendum: UA unremarkable. Medicine will sign off at this time.     Medicine will follow-up on ECG and UA. Please notify medicine with any additional questions or concerns.     Angelica Valencia PA-C  Hospitalist Service  Pager [phone]      " all other ROS negative except as per HPI

## 2022-01-10 NOTE — ED PROVIDER NOTE - MDM ORDERS SUBMITTED SELECTION
Imaging Studies
GEN - NAD; well appearing; A+O x3   HEAD - NC/AT   EYES- PERRL, EOMI  ENT: Airway patent, mmm  NECK: Neck supple  PULMONARY - CTA b/l, symmetric breath sounds.   CARDIAC -s1s2, RRR, no M,G,R  ABDOMEN - +BS, ND, NT, soft, no guarding, no rebound, no masses   NEUROLOGIC - alert, speech clear, normal gait  PSYCH -nl mood/affect, nl insight.

## 2022-01-10 NOTE — ED PROVIDER NOTE - ENMT, MLM
Airway patent, Nasal mucosa clear. Mouth with normal mucosa. Throat has no vesicles, no oropharyngeal exudates and uvula is midline. Airway patent, face mask in place

## 2022-01-11 PROBLEM — D64.9 ANEMIA, UNSPECIFIED: Chronic | Status: ACTIVE | Noted: 2021-11-30

## 2022-01-11 PROBLEM — I25.10 ATHEROSCLEROTIC HEART DISEASE OF NATIVE CORONARY ARTERY WITHOUT ANGINA PECTORIS: Chronic | Status: ACTIVE | Noted: 2021-11-30

## 2022-01-11 PROBLEM — G20 PARKINSON'S DISEASE: Chronic | Status: ACTIVE | Noted: 2021-11-30

## 2022-01-11 PROBLEM — I10 ESSENTIAL (PRIMARY) HYPERTENSION: Chronic | Status: ACTIVE | Noted: 2021-11-30

## 2022-02-18 ENCOUNTER — INPATIENT (INPATIENT)
Facility: HOSPITAL | Age: 87
LOS: 3 days | Discharge: HOME | DRG: 872 | End: 2022-02-22
Attending: HOSPITALIST | Admitting: HOSPITALIST
Payer: MEDICARE

## 2022-02-18 VITALS
OXYGEN SATURATION: 98 % | TEMPERATURE: 99 F | SYSTOLIC BLOOD PRESSURE: 154 MMHG | DIASTOLIC BLOOD PRESSURE: 59 MMHG | HEART RATE: 81 BPM | RESPIRATION RATE: 15 BRPM | WEIGHT: 149.91 LBS | HEIGHT: 63 IN

## 2022-02-18 DIAGNOSIS — R10.9 UNSPECIFIED ABDOMINAL PAIN: ICD-10-CM

## 2022-02-18 LAB
ADD ON TEST-SPECIMEN IN LAB: SIGNIFICANT CHANGE UP
ALBUMIN SERPL ELPH-MCNC: 3.3 G/DL — SIGNIFICANT CHANGE UP (ref 3.3–5)
ALP SERPL-CCNC: 100 U/L — SIGNIFICANT CHANGE UP (ref 40–120)
ALT FLD-CCNC: 17 U/L — SIGNIFICANT CHANGE UP (ref 12–78)
ANION GAP SERPL CALC-SCNC: 5 MMOL/L — SIGNIFICANT CHANGE UP (ref 5–17)
APPEARANCE UR: ABNORMAL
APTT BLD: 30.2 SEC — SIGNIFICANT CHANGE UP (ref 27.5–35.5)
AST SERPL-CCNC: 13 U/L — LOW (ref 15–37)
BASOPHILS # BLD AUTO: 0.05 K/UL — SIGNIFICANT CHANGE UP (ref 0–0.2)
BASOPHILS NFR BLD AUTO: 0.7 % — SIGNIFICANT CHANGE UP (ref 0–2)
BILIRUB SERPL-MCNC: 0.4 MG/DL — SIGNIFICANT CHANGE UP (ref 0.2–1.2)
BILIRUB UR-MCNC: NEGATIVE — SIGNIFICANT CHANGE UP
BUN SERPL-MCNC: 18 MG/DL — SIGNIFICANT CHANGE UP (ref 7–23)
CALCIUM SERPL-MCNC: 9.1 MG/DL — SIGNIFICANT CHANGE UP (ref 8.5–10.1)
CHLORIDE SERPL-SCNC: 104 MMOL/L — SIGNIFICANT CHANGE UP (ref 96–108)
CO2 SERPL-SCNC: 29 MMOL/L — SIGNIFICANT CHANGE UP (ref 22–31)
COLOR SPEC: YELLOW — SIGNIFICANT CHANGE UP
CREAT SERPL-MCNC: 0.65 MG/DL — SIGNIFICANT CHANGE UP (ref 0.5–1.3)
DIFF PNL FLD: ABNORMAL
EOSINOPHIL # BLD AUTO: 0.05 K/UL — SIGNIFICANT CHANGE UP (ref 0–0.5)
EOSINOPHIL NFR BLD AUTO: 0.7 % — SIGNIFICANT CHANGE UP (ref 0–6)
GLUCOSE SERPL-MCNC: 112 MG/DL — HIGH (ref 70–99)
GLUCOSE UR QL: NEGATIVE — SIGNIFICANT CHANGE UP
HCT VFR BLD CALC: 41.3 % — SIGNIFICANT CHANGE UP (ref 34.5–45)
HGB BLD-MCNC: 13.7 G/DL — SIGNIFICANT CHANGE UP (ref 11.5–15.5)
IMM GRANULOCYTES NFR BLD AUTO: 0.1 % — SIGNIFICANT CHANGE UP (ref 0–1.5)
INR BLD: 1.1 RATIO — SIGNIFICANT CHANGE UP (ref 0.88–1.16)
KETONES UR-MCNC: ABNORMAL
LACTATE SERPL-SCNC: 0.7 MMOL/L — SIGNIFICANT CHANGE UP (ref 0.7–2)
LEUKOCYTE ESTERASE UR-ACNC: ABNORMAL
LIDOCAIN IGE QN: 111 U/L — SIGNIFICANT CHANGE UP (ref 73–393)
LYMPHOCYTES # BLD AUTO: 0.48 K/UL — LOW (ref 1–3.3)
LYMPHOCYTES # BLD AUTO: 6.9 % — LOW (ref 13–44)
MCHC RBC-ENTMCNC: 30.9 PG — SIGNIFICANT CHANGE UP (ref 27–34)
MCHC RBC-ENTMCNC: 33.2 GM/DL — SIGNIFICANT CHANGE UP (ref 32–36)
MCV RBC AUTO: 93.2 FL — SIGNIFICANT CHANGE UP (ref 80–100)
MONOCYTES # BLD AUTO: 0.58 K/UL — SIGNIFICANT CHANGE UP (ref 0–0.9)
MONOCYTES NFR BLD AUTO: 8.4 % — SIGNIFICANT CHANGE UP (ref 2–14)
NEUTROPHILS # BLD AUTO: 5.77 K/UL — SIGNIFICANT CHANGE UP (ref 1.8–7.4)
NEUTROPHILS NFR BLD AUTO: 83.2 % — HIGH (ref 43–77)
NITRITE UR-MCNC: POSITIVE
PH UR: 6 — SIGNIFICANT CHANGE UP (ref 5–8)
PLATELET # BLD AUTO: 242 K/UL — SIGNIFICANT CHANGE UP (ref 150–400)
POTASSIUM SERPL-MCNC: 3.7 MMOL/L — SIGNIFICANT CHANGE UP (ref 3.5–5.3)
POTASSIUM SERPL-SCNC: 3.7 MMOL/L — SIGNIFICANT CHANGE UP (ref 3.5–5.3)
PROT SERPL-MCNC: 7 GM/DL — SIGNIFICANT CHANGE UP (ref 6–8.3)
PROT UR-MCNC: 15
PROTHROM AB SERPL-ACNC: 12.8 SEC — SIGNIFICANT CHANGE UP (ref 10.6–13.6)
RBC # BLD: 4.43 M/UL — SIGNIFICANT CHANGE UP (ref 3.8–5.2)
RBC # FLD: 13.1 % — SIGNIFICANT CHANGE UP (ref 10.3–14.5)
SODIUM SERPL-SCNC: 138 MMOL/L — SIGNIFICANT CHANGE UP (ref 135–145)
SP GR SPEC: 1.01 — SIGNIFICANT CHANGE UP (ref 1.01–1.02)
UROBILINOGEN FLD QL: NEGATIVE — SIGNIFICANT CHANGE UP
WBC # BLD: 6.94 K/UL — SIGNIFICANT CHANGE UP (ref 3.8–10.5)
WBC # FLD AUTO: 6.94 K/UL — SIGNIFICANT CHANGE UP (ref 3.8–10.5)

## 2022-02-18 PROCEDURE — 84443 ASSAY THYROID STIM HORMONE: CPT

## 2022-02-18 PROCEDURE — 82728 ASSAY OF FERRITIN: CPT

## 2022-02-18 PROCEDURE — U0005: CPT

## 2022-02-18 PROCEDURE — 80048 BASIC METABOLIC PNL TOTAL CA: CPT

## 2022-02-18 PROCEDURE — 93010 ELECTROCARDIOGRAM REPORT: CPT

## 2022-02-18 PROCEDURE — 80053 COMPREHEN METABOLIC PANEL: CPT

## 2022-02-18 PROCEDURE — 99223 1ST HOSP IP/OBS HIGH 75: CPT | Mod: AI

## 2022-02-18 PROCEDURE — 83605 ASSAY OF LACTIC ACID: CPT

## 2022-02-18 PROCEDURE — 85018 HEMOGLOBIN: CPT

## 2022-02-18 PROCEDURE — 36415 COLL VENOUS BLD VENIPUNCTURE: CPT

## 2022-02-18 PROCEDURE — 97530 THERAPEUTIC ACTIVITIES: CPT | Mod: GP

## 2022-02-18 PROCEDURE — 87493 C DIFF AMPLIFIED PROBE: CPT

## 2022-02-18 PROCEDURE — C9113: CPT

## 2022-02-18 PROCEDURE — 83735 ASSAY OF MAGNESIUM: CPT

## 2022-02-18 PROCEDURE — 97116 GAIT TRAINING THERAPY: CPT | Mod: GP

## 2022-02-18 PROCEDURE — 83540 ASSAY OF IRON: CPT

## 2022-02-18 PROCEDURE — 82607 VITAMIN B-12: CPT

## 2022-02-18 PROCEDURE — 74177 CT ABD & PELVIS W/CONTRAST: CPT | Mod: 26,MA

## 2022-02-18 PROCEDURE — 85014 HEMATOCRIT: CPT

## 2022-02-18 PROCEDURE — 99497 ADVNCD CARE PLAN 30 MIN: CPT | Mod: 25

## 2022-02-18 PROCEDURE — 87040 BLOOD CULTURE FOR BACTERIA: CPT

## 2022-02-18 PROCEDURE — 85025 COMPLETE CBC W/AUTO DIFF WBC: CPT

## 2022-02-18 PROCEDURE — 87507 IADNA-DNA/RNA PROBE TQ 12-25: CPT

## 2022-02-18 PROCEDURE — 97162 PT EVAL MOD COMPLEX 30 MIN: CPT | Mod: GP

## 2022-02-18 PROCEDURE — 99285 EMERGENCY DEPT VISIT HI MDM: CPT

## 2022-02-18 PROCEDURE — 82746 ASSAY OF FOLIC ACID SERUM: CPT

## 2022-02-18 PROCEDURE — U0003: CPT

## 2022-02-18 PROCEDURE — 83550 IRON BINDING TEST: CPT

## 2022-02-18 PROCEDURE — 84100 ASSAY OF PHOSPHORUS: CPT

## 2022-02-18 RX ORDER — PANTOPRAZOLE SODIUM 20 MG/1
40 TABLET, DELAYED RELEASE ORAL ONCE
Refills: 0 | Status: COMPLETED | OUTPATIENT
Start: 2022-02-18 | End: 2022-02-18

## 2022-02-18 RX ORDER — LOSARTAN POTASSIUM 100 MG/1
25 TABLET, FILM COATED ORAL
Refills: 0 | Status: DISCONTINUED | OUTPATIENT
Start: 2022-02-18 | End: 2022-02-22

## 2022-02-18 RX ORDER — ATENOLOL 25 MG/1
50 TABLET ORAL EVERY 12 HOURS
Refills: 0 | Status: DISCONTINUED | OUTPATIENT
Start: 2022-02-18 | End: 2022-02-22

## 2022-02-18 RX ORDER — CIPROFLOXACIN LACTATE 400MG/40ML
400 VIAL (ML) INTRAVENOUS ONCE
Refills: 0 | Status: COMPLETED | OUTPATIENT
Start: 2022-02-18 | End: 2022-02-18

## 2022-02-18 RX ORDER — ENOXAPARIN SODIUM 100 MG/ML
40 INJECTION SUBCUTANEOUS DAILY
Refills: 0 | Status: DISCONTINUED | OUTPATIENT
Start: 2022-02-18 | End: 2022-02-22

## 2022-02-18 RX ORDER — ACETAMINOPHEN 500 MG
650 TABLET ORAL EVERY 6 HOURS
Refills: 0 | Status: DISCONTINUED | OUTPATIENT
Start: 2022-02-18 | End: 2022-02-22

## 2022-02-18 RX ORDER — ONDANSETRON 8 MG/1
4 TABLET, FILM COATED ORAL EVERY 8 HOURS
Refills: 0 | Status: DISCONTINUED | OUTPATIENT
Start: 2022-02-18 | End: 2022-02-22

## 2022-02-18 RX ORDER — ALPRAZOLAM 0.25 MG
0.5 TABLET ORAL AT BEDTIME
Refills: 0 | Status: DISCONTINUED | OUTPATIENT
Start: 2022-02-18 | End: 2022-02-22

## 2022-02-18 RX ORDER — ONDANSETRON 8 MG/1
4 TABLET, FILM COATED ORAL ONCE
Refills: 0 | Status: COMPLETED | OUTPATIENT
Start: 2022-02-18 | End: 2022-02-18

## 2022-02-18 RX ORDER — LANOLIN ALCOHOL/MO/W.PET/CERES
3 CREAM (GRAM) TOPICAL AT BEDTIME
Refills: 0 | Status: DISCONTINUED | OUTPATIENT
Start: 2022-02-18 | End: 2022-02-22

## 2022-02-18 RX ORDER — CEPHALEXIN 500 MG
500 CAPSULE ORAL ONCE
Refills: 0 | Status: DISCONTINUED | OUTPATIENT
Start: 2022-02-18 | End: 2022-02-18

## 2022-02-18 RX ORDER — AMLODIPINE BESYLATE 2.5 MG/1
2.5 TABLET ORAL DAILY
Refills: 0 | Status: DISCONTINUED | OUTPATIENT
Start: 2022-02-18 | End: 2022-02-22

## 2022-02-18 RX ORDER — PANTOPRAZOLE SODIUM 20 MG/1
40 TABLET, DELAYED RELEASE ORAL EVERY 12 HOURS
Refills: 0 | Status: DISCONTINUED | OUTPATIENT
Start: 2022-02-18 | End: 2022-02-22

## 2022-02-18 RX ORDER — SODIUM CHLORIDE 9 MG/ML
1000 INJECTION, SOLUTION INTRAVENOUS ONCE
Refills: 0 | Status: DISCONTINUED | OUTPATIENT
Start: 2022-02-18 | End: 2022-02-18

## 2022-02-18 RX ORDER — SODIUM CHLORIDE 9 MG/ML
1000 INJECTION INTRAMUSCULAR; INTRAVENOUS; SUBCUTANEOUS ONCE
Refills: 0 | Status: COMPLETED | OUTPATIENT
Start: 2022-02-18 | End: 2022-02-18

## 2022-02-18 RX ORDER — ONDANSETRON 8 MG/1
1 TABLET, FILM COATED ORAL
Qty: 15 | Refills: 0
Start: 2022-02-18

## 2022-02-18 RX ORDER — AZTREONAM 2 G
160 VIAL (EA) INJECTION
Qty: 14 | Refills: 0
Start: 2022-02-18 | End: 2022-02-24

## 2022-02-18 RX ORDER — CARBIDOPA AND LEVODOPA 25; 100 MG/1; MG/1
1 TABLET ORAL
Refills: 0 | Status: DISCONTINUED | OUTPATIENT
Start: 2022-02-18 | End: 2022-02-22

## 2022-02-18 RX ADMIN — Medication 1 TABLET(S): at 13:57

## 2022-02-18 RX ADMIN — Medication 200 MILLIGRAM(S): at 15:52

## 2022-02-18 RX ADMIN — PANTOPRAZOLE SODIUM 40 MILLIGRAM(S): 20 TABLET, DELAYED RELEASE ORAL at 22:27

## 2022-02-18 RX ADMIN — PANTOPRAZOLE SODIUM 40 MILLIGRAM(S): 20 TABLET, DELAYED RELEASE ORAL at 15:52

## 2022-02-18 RX ADMIN — Medication 650 MILLIGRAM(S): at 21:20

## 2022-02-18 RX ADMIN — SODIUM CHLORIDE 2000 MILLILITER(S): 9 INJECTION INTRAMUSCULAR; INTRAVENOUS; SUBCUTANEOUS at 12:19

## 2022-02-18 RX ADMIN — Medication 0.5 MILLIGRAM(S): at 22:27

## 2022-02-18 RX ADMIN — CARBIDOPA AND LEVODOPA 1 TABLET(S): 25; 100 TABLET ORAL at 22:28

## 2022-02-18 RX ADMIN — LOSARTAN POTASSIUM 25 MILLIGRAM(S): 100 TABLET, FILM COATED ORAL at 22:28

## 2022-02-18 RX ADMIN — ONDANSETRON 4 MILLIGRAM(S): 8 TABLET, FILM COATED ORAL at 16:55

## 2022-02-18 RX ADMIN — ATENOLOL 50 MILLIGRAM(S): 25 TABLET ORAL at 22:28

## 2022-02-18 NOTE — H&P ADULT - ASSESSMENT
MEDICATIONS  (STANDING):  ALPRAZolam 0.5 milliGRAM(s) Oral at bedtime  amLODIPine   Tablet 2.5 milliGRAM(s) Oral daily  ATENolol  Tablet 50 milliGRAM(s) Oral every 12 hours  carbidopa/levodopa  25/100 1 Tablet(s) Oral <User Schedule>  enoxaparin Injectable 40 milliGRAM(s) SubCutaneous daily  losartan 25 milliGRAM(s) Oral two times a day  pantoprazole  Injectable 40 milliGRAM(s) IV Push every 12 hours    MEDICATIONS  (PRN):  acetaminophen     Tablet .. 650 milliGRAM(s) Oral every 6 hours PRN Temp greater or equal to 38C (100.4F), Mild Pain (1 - 3)  aluminum hydroxide/magnesium hydroxide/simethicone Suspension 30 milliLiter(s) Oral every 4 hours PRN Dyspepsia  melatonin 3 milliGRAM(s) Oral at bedtime PRN Insomnia  ondansetron Injectable 4 milliGRAM(s) IV Push every 8 hours PRN Nausea and/or Vomiting      ASSESSMENT/PLAN    Ileus vs Possible early SBO/Partial SBO  Reported dark hematemesis x 1 with stable Hgb. R/O Upper GIB  Associated intractable nausea/vomiting due to above  -Admit to medicine  -Resolving symptoms  -NPO except for meds  -PPI  -GI/Surgery consult  -Advance diet as per GI/Surgery  -Pain control/bowel regimen   -Continue to monitor Hgb    Parkinson's   -Home meds    HTN. Uncontrolled  -Home Rx. Titrate accordingly  -PRN Rx for elevated BP    DVT Prophylaxis: Lovenox subq    Goals of Care (GOC)/Advance Care Planning (ACP)  Date of Discussion/evaluation: 2/18/2022  Purpose of Discussion: In the setting of advanced age and multiple comorbidities, discussion of patient's wished should there be any deterioration in health status.   Parties Present/Discussed with: Patient, myself and one of her HCP (oh/Key Hernandez [phone]) at bedside  Patient's Decision making capacity at the time of discussion: Patient is AAOx3 and has decision making capacity  Presentation: As above  GOC: Code Status, HCP, Alternative Feeding Methods, Blood product Transfusions    PLAN:  Code Status: Full Code  HCP: 2 Daughters: Key Hernandez ([phone]) and Maria Fernanda Sullivan ([phone])  Alternative Feeding methods: Would like to discuss or assess should the situation arise that it requires alternative feeding methods  Blood Product Transfusions: YES agreeable  Pressors: YES agreeable    ACP/GOC Time Spent: 17 minutes

## 2022-02-18 NOTE — PROVIDER CONTACT NOTE (OTHER) - SITUATION
ANSWERING SERVICE AWARE OF CONSULT.
Detail Level: Zone
Plan: Location: face\\nPrescription: Ximino 135mg and plexion sulfa wash\\n\\n10/26/2021\\nPatient is here for acne \\nPatient states is still currently experiencing acne breakouts that he finds bothersome. He is currently using OTC products to treat his acne\\nPatient states he would like to start a treatment regimen to get his acne under control \\nPatient is here for further evaluation and treatment \\n\\n\\nDiscussed with patient that this is an immune mediated condition triggered with stress, lack of sleep, and also has a major genetic component\\nEducated patient on Accutane 6 month treatment course, side effects, and iPledge program/requirements (2 negative pregnancy tests 30 days apart required)\\nCommon side effects from therapy: dryness, joint pain, mood changes, headaches, nose bleeds\\nDiscussed with patient that Accutane is a Vitamin A derivative\\nDiscussed with patient that Accutane obliterates oil glands and a cure for acne vs. antibiotics which is a temporary treatment\\nDiscussed with patient that medication is processed by the liver and requires blood work to monitor liver functions\\n\\nDiscussed with pt that we will prescribe Ximino 135mg and Plexion Wash (2-5 minutes) daily to help reduce inflammation.\\n\\n\\nWill f/u 3months
Plan: Location: face\\nPrescribed: HQ 4% Tretinoin 0.05% compound cream QHS\\n\\nPt has hyperpigmentation on face today.\\nDiscussed with pt that this pigmentation is due to sun damage to the skin, advised patient to use SPF daily.\\nDiscussed with pt that we will start patient on HQ 4% Tretinoin 0.05% to use nightly.\\nDiscussed with pt to apply a pea size amount to face, pushing into pores. \\nDiscussed with pt to avoid areas around eyes, nasal crease, and around the lips since skin is thin and may get irritated easily.\\n\\nDiscussed with pt that it will come in a box that says keep refrigerated.\\nDiscussed with pt that they do not need to keep it refrigerated, but have to keep it in a dark place since sunlight may oxidize it.\\nDiscussed with pt that if skin becomes irritated while using cream, then pt can change frequency to every other night, or every other other night, etc.\\nDiscussed with pt that results are not immediate and may take up to a month to notice change.\\nAdvised pt to apply a moisturizing cream such as Cerave afterwards to help reestablish a healthy skin barrier.\\nF/u as needed.

## 2022-02-18 NOTE — H&P ADULT - HISTORY OF PRESENT ILLNESS
85 y/o F with PMHx of CAD on Plavix, anemia, Parkinson's, HTN, anxiety, presents to ED from [facility] because of intractable nausea and vomiting and intermittent abdominal pain x 1-2 days. Initially bilious non-bloody. Administered anti-emetics in the ER with some improvement but then administered bactrim and given something to eat prior to ER discharge adn reported dark coffee ground emesis x 1 and unable to tolerate PO. Since then patient does report improved nausea and BM x 2 since arrival to ER. Some intermittent 5-8/10 cramping pain with some interval improvement.    In the ER, Afebrile and hemodynamically stable (some elevated BP), CBC, CMP. Lactate, Troponin WNL. CT Abdomen with findings of SBO/Ileus (mild) without transition point. UA suggestive of UTI (Pencillin allergy with unknown reaction reported)     Family History: Mother  age 61 from Brain Ca and HD; Fther  age 81 with alzheimers  Social History: denies tobacco, etoh, illicit drug use. Ambulates with assistance and assistive device at [facility].

## 2022-02-18 NOTE — ED ADULT NURSE NOTE - OBJECTIVE STATEMENT
Pt presents to ED for coffee ground emesis from nursing facility. Pt presents to ED for coffee ground emesis from nursing facility. Pt states that she a history of anemia, received iron infusions. Pt states that she threw up twice this morning. As per daughter, pt has a history of GI bleed, is on plavix. Denies diarrhea.

## 2022-02-18 NOTE — ED PROVIDER NOTE - PROGRESS NOTE DETAILS
Lucian Aguilar for attending Dr. Austin: daughter and patient updated on test results and CAT scan. Patient has no abd pain, no vomiting. Plan: will start patient on bactrim. Attending anika Austin vomit dark material.  Plan admit lot 211 - dark vomit, heme +, qc reactive. Attending Austin, pt vomit dark material.  Pt not tolerating PO.  Plan admit lot 211 - dark vomit, trace heme +, qc reactive. Attending Austin, pt vomit dark material.  Pt not tolerating PO.  unsure if bactrim stayed down.  Plan admit.  Daughter updated. Attending koby Austin/w Dr. Fried for admission

## 2022-02-18 NOTE — H&P ADULT - NSHPLABSRESULTS_GEN_ALL_CORE
13.7   6.94  )-----------( 242      ( 18 Feb 2022 10:33 )             41.3     02-18    138  |  104  |  18  ----------------------------<  112<H>  3.7   |  29  |  0.65    Ca    9.1      18 Feb 2022 10:33    TPro  7.0  /  Alb  3.3  /  TBili  0.4  /  DBili  x   /  AST  13<L>  /  ALT  17  /  AlkPhos  100  02-18    COVID-19 PCR: NotDetec (18 Feb 2022 10:40)    CAPILLARY BLOOD GLUCOSE    Lactate, Blood: 0.7 mmol/L (02.18.22 @ 15:31) Troponin I, High Sensitivity Result: 6.21: SLipase, Serum: 111 U/L (02.18.22 @ 10:33) < from: CT Abdomen and Pelvis w/ IV Cont (02.18.22 @ 12:09) >      IMPRESSION:  Fluid-filled dilatation of the small and large bowel without transition   point or obstructing mass identified.    Findings could be due to ileus, however obstruction is not excluded.    < end of copied text >      I personally reviewed labs, imaging, ekg, orders and vitals.

## 2022-02-18 NOTE — PATIENT PROFILE ADULT - FUNCTIONAL SCREEN CURRENT LEVEL: SWALLOWING (IF SCORE 2 OR MORE FOR ANY ITEM, CONSULT REHAB SERVICES), MLM)
Please review individual treatment plan for cardiac rehab phase 2 0 = swallows foods/liquids without difficulty

## 2022-02-18 NOTE — CONSULT NOTE ADULT - SUBJECTIVE AND OBJECTIVE BOX
HPI:      PAST MEDICAL & SURGICAL HISTORY:  HTN (hypertension)    CAD (coronary artery disease)    Parkinsons    Anemia        MEDICATIONS  (STANDING):  ALPRAZolam 0.5 milliGRAM(s) Oral at bedtime  amLODIPine   Tablet 2.5 milliGRAM(s) Oral daily  ATENolol  Tablet 50 milliGRAM(s) Oral every 12 hours  carbidopa/levodopa  25/100 1 Tablet(s) Oral <User Schedule>  enoxaparin Injectable 40 milliGRAM(s) SubCutaneous daily  losartan 25 milliGRAM(s) Oral two times a day  pantoprazole  Injectable 40 milliGRAM(s) IV Push every 12 hours    MEDICATIONS  (PRN):  acetaminophen     Tablet .. 650 milliGRAM(s) Oral every 6 hours PRN Temp greater or equal to 38C (100.4F), Mild Pain (1 - 3)  aluminum hydroxide/magnesium hydroxide/simethicone Suspension 30 milliLiter(s) Oral every 4 hours PRN Dyspepsia  melatonin 3 milliGRAM(s) Oral at bedtime PRN Insomnia  ondansetron Injectable 4 milliGRAM(s) IV Push every 8 hours PRN Nausea and/or Vomiting      Allergies    Allergy Status Unknown    Intolerances        SOCIAL HISTORY:    FAMILY HISTORY:   Non-contributory    REVIEW OF SYSTEMS      General:	    Respiratory and Thorax:  	  Cardiovascular:	    Gastrointestinal:	    Musculoskeletal:	   Vital Signs Last 24 Hrs  T(C): 37.6 (18 Feb 2022 19:53), Max: 37.6 (18 Feb 2022 19:53)  T(F): 99.6 (18 Feb 2022 19:53), Max: 99.6 (18 Feb 2022 19:53)  HR: 95 (18 Feb 2022 19:53) (81 - 95)  BP: 154/64 (18 Feb 2022 19:53) (154/59 - 174/66)  BP(mean): 96 (18 Feb 2022 15:13) (96 - 96)  RR: 19 (18 Feb 2022 19:53) (15 - 23)  SpO2: 97% (18 Feb 2022 19:53) (95% - 100%)    HEENT :No Pallor.No icterus. EOMI,PERLAA  Chest : Clear to Auscultation  CVS : S1S2 Normal.No murmurs.  Abdomen: Soft.mild diffuse tenderness  CNS: Alert.Oriented to Time,Place and Person.No focal deficit.  EXT: Normal Range of motion.No pitting edema.    LABS:                        13.7   6.94  )-----------( 242      ( 18 Feb 2022 10:33 )             41.3     02-18    138  |  104  |  18  ----------------------------<  112<H>  3.7   |  29  |  0.65    Ca    9.1      18 Feb 2022 10:33    TPro  7.0  /  Alb  3.3  /  TBili  0.4  /  DBili  x   /  AST  13<L>  /  ALT  17  /  AlkPhos  100  02-18    PT/INR - ( 18 Feb 2022 10:33 )   PT: 12.8 sec;   INR: 1.10 ratio         PTT - ( 18 Feb 2022 10:33 )  PTT:30.2 sec  LIVER FUNCTIONS - ( 18 Feb 2022 10:33 )  Alb: 3.3 g/dL / Pro: 7.0 gm/dL / ALK PHOS: 100 U/L / ALT: 17 U/L / AST: 13 U/L / GGT: x             RADIOLOGY & ADDITIONAL STUDIES:

## 2022-02-18 NOTE — ED PROVIDER NOTE - NSFOLLOWUPINSTRUCTIONS_ED_ALL_ED_FT
Please call and follow up with your doctor in 1-3 days.    Return to the Emergency Department for worsening or persistent symptoms, and/or ANY NEW OR CONCERNING SYMPTOMS. If you have issues obtaining follow up, please call: [phone] or [phone]  to obtain a doctor or specialist who takes your insurance in your area.    Gastroenteritis    WHAT YOU NEED TO KNOW:    Gastroenteritis, or stomach flu, is an infection of the stomach and intestines.  Digestive Tract    DISCHARGE INSTRUCTIONS:    Call 911 for any of the following:    You have trouble breathing or a very fast pulse.    Seek care immediately if:    You see blood in your diarrhea.    You cannot stop vomiting.    You have not urinated for 12 hours.    You feel like you are going to faint.  Contact your healthcare provider if:    You have a fever.    You continue to vomit or have diarrhea, even after treatment.    You see worms in your diarrhea.    Your mouth or eyes are dry. You are not urinating as much or as often.    You have questions or concerns about your condition or care.  Medicines:    Medicines may be given to stop vomiting or diarrhea, decrease abdominal cramps, or treat an infection.    Take your medicine as directed. Contact your healthcare provider if you think your medicine is not helping or if you have side effects. Tell him or her if you are allergic to any medicine. Keep a list of the medicines, vitamins, and herbs you take. Include the amounts, and when and why you take them. Bring the list or the pill bottles to follow-up visits. Carry your medicine list with you in case of an emergency.  Manage your symptoms:    Drink liquids as directed. Ask your healthcare provider how much liquid to drink each day, and which liquids are best for you. You may also need to drink an oral rehydration solution (ORS). An ORS has the right amounts of sugar, salt, and minerals in water to replace body fluids.    Eat bland foods. When you feel hungry, begin eating soft, bland foods. Examples are bananas, clear soup, potatoes, and applesauce. Do not have dairy products, alcohol, sugary drinks, or drinks with caffeine until you feel better.    Rest as much as possible. Slowly start to do more each day when you begin to feel better.  Prevent the spread of gastroenteritis: Gastroenteritis can spread easily. Keep yourself, your family, and your surroundings clean to help prevent the spread of gastroenteritis:    Wash your hands often. Use soap and water. Wash your hands after you use the bathroom, change a child's diapers, or sneeze. Wash your hands before you prepare or eat food.  Handwashing      Clean surfaces and do laundry often. Wash your clothes and towels separately from the rest of the laundry. Clean surfaces in your home with antibacterial  or bleach.    Clean food thoroughly and cook safely. Wash raw vegetables before you cook. Cook meat, fish, and eggs fully. Do not use the same dishes for raw meat as you do for other foods. Refrigerate any leftover food immediately.    Be aware when you camp or travel. Drink only clean water. Do not drink from rivers or lakes unless you purify or boil the water first. When you travel, drink bottled water and do not add ice. Do not eat fruit that has not been peeled. Do not eat raw fish or meat that is not fully cooked.  Follow up with your doctor as directed: Write down your questions so you remember to ask them during your visits.    Urinary Tract Infection, Adult  ImageA urinary tract infection (UTI) is an infection of any part of the urinary tract, which includes the kidneys, ureters, bladder, and urethra. These organs make, store, and get rid of urine in the body. UTI can be a bladder infection (cystitis) or kidney infection (pyelonephritis).    What are the causes?  This infection may be caused by fungi, viruses, or bacteria. Bacteria are the most common cause of UTIs. This condition can also be caused by repeated incomplete emptying of the bladder during urination.    What increases the risk?  This condition is more likely to develop if:    You ignore your need to urinate or hold urine for long periods of time.  You do not empty your bladder completely during urination.  You wipe back to front after urinating or having a bowel movement, if you are female.  You are uncircumcised, if you are male.  You are constipated.  You have a urinary catheter that stays in place (indwelling).  You have a weak defense (immune) system.  You have a medical condition that affects your bowels, kidneys, or bladder.  You have diabetes.  You take antibiotic medicines frequently or for long periods of time, and the antibiotics no longer work well against certain types of infections (antibiotic resistance).  You take medicines that irritate your urinary tract.  You are exposed to chemicals that irritate your urinary tract.  You are female.    What are the signs or symptoms?  Symptoms of this condition include:    Fever.  Frequent urination or passing small amounts of urine frequently.  Needing to urinate urgently.  Pain or burning with urination.  Urine that smells bad or unusual.  Cloudy urine.  Pain in the lower abdomen or back.  Trouble urinating.  Blood in the urine.  Vomiting or being less hungry than normal.  Diarrhea or abdominal pain.  Vaginal discharge, if you are female.    How is this diagnosed?  This condition is diagnosed with a medical history and physical exam. You will also need to provide a urine sample to test your urine. Other tests may be done, including:    Blood tests.  Sexually transmitted disease (STD) testing.    If you have had more than one UTI, a cystoscopy or imaging studies may be done to determine the cause of the infections.    How is this treated?  Treatment for this condition often includes a combination of two or more of the following:    Antibiotic medicine.  Other medicines to treat less common causes of UTI.  Over-the-counter medicines to treat pain.  Drinking enough water to stay hydrated.    Follow these instructions at home:  Take over-the-counter and prescription medicines only as told by your health care provider.  If you were prescribed an antibiotic, take it as told by your health care provider. Do not stop taking the antibiotic even if you start to feel better.  Avoid alcohol, caffeine, tea, and carbonated beverages. They can irritate your bladder.  Drink enough fluid to keep your urine clear or pale yellow.  Keep all follow-up visits as told by your health care provider. This is important.  ImageMake sure to:    Empty your bladder often and completely. Do not hold urine for long periods of time.  Empty your bladder before and after sex.  Wipe from front to back after a bowel movement if you are female. Use each tissue one time when you wipe.    Contact a health care provider if:  You have back pain.  You have a fever.  You feel nauseous or vomit.  Your symptoms do not get better after 3 days.  Your symptoms go away and then return.  Get help right away if:  You have severe back pain or lower abdominal pain.  You are vomiting and cannot keep down any medicines or water.  This information is not intended to replace advice given to you by your health care provider. Make sure you discuss any questions you have with your health care provider.

## 2022-02-18 NOTE — PHARMACOTHERAPY INTERVENTION NOTE - COMMENTS
Med history complete, reviewed medications and allergies with patients med list from [facility] and confirmed medication list with doctor first med profile

## 2022-02-18 NOTE — ED PROVIDER NOTE - PATIENT PORTAL LINK FT
You can access the FollowMyHealth Patient Portal offered by [facility] by registering at the following website: [email or web address]. By joining [facility]’s FollowMyHealth portal, you will also be able to view your health information using other applications (apps) compatible with our system.

## 2022-02-18 NOTE — ED PROVIDER NOTE - OBJECTIVE STATEMENT
87 y/o F with PMHx of CAD on Plavix, anemia, Parkinson's, HTN, anxiety, presents to ED c/o vomiting dark material. Patient is on Plavix. Patient states symptoms began this morning and vomited two times. Patient is on iron and usually has dark stools. +gas pain. No abd pain currently.   No travel, no sick contact. 87 y/o F with PMHx of CAD on Plavix, anemia, Parkinson's, HTN, anxiety, presents to ED c/o vomiting dark material. Patient is on Plavix and takes iron. Patient states symptoms began this morning and vomited two times. Patient is on iron and usually has dark stools. +gas pain. No abd pain currently.   No travel, no sick contact.

## 2022-02-18 NOTE — ED PROVIDER NOTE - CADM POA PRESS ULCER
(1) Visual, tactile, auditory, spatial, or personal inattention or extinction to bilateral simultaneous stimulation in one of the sensory modalities No

## 2022-02-18 NOTE — H&P ADULT - NSHPPHYSICALEXAM_GEN_ALL_CORE
PHYSICAL EXAM:    T(C): 37.6 (02-18-22 @ 19:53), Max: 37.6 (02-18-22 @ 19:53)  HR: 95 (02-18-22 @ 19:53) (81 - 95)  BP: 154/64 (02-18-22 @ 19:53) (154/59 - 174/66)  RR: 19 (02-18-22 @ 19:53) (15 - 23)  SpO2: 97% (02-18-22 @ 19:53) (95% - 100%)    General: AAOx3; NAD  Head: AT/NC  ENT: Dry Mucous Membranes; No Injury  Eyes: EOMI; PERRL; Baseline Left eye lid lag  Neck: Non-tender; No JVD  CVS: RRR, S1&S2, No murmur,   Respiratory: Lungs CTA B/L; Normal Respiratory Effort  Abdomen/GI: Soft, non-tender, non-distended, no guarding, no rebound, normal bowel sounds  : No bladder distention  Extremities: No cyanosis, No clubbing,  MSK: No CVA tenderness, Normal ROM, No injury  Neuro: AAOx3, CNII-XII grossly intact (except baseline left eye lid lag), non-focal  Psych: Appropriate, Cooperative, Flat affect  Skin: Clean, Dry and Intact

## 2022-02-18 NOTE — ED PROVIDER NOTE - CARE PLAN
1 Principal Discharge DX:	Abdominal pain with vomiting  Secondary Diagnosis:	Acute UTI   Principal Discharge DX:	Abdominal pain with vomiting  Secondary Diagnosis:	Acute UTI  Secondary Diagnosis:	Upper GI bleed

## 2022-02-18 NOTE — PATIENT PROFILE ADULT - FALL HARM RISK - HARM RISK INTERVENTIONS

## 2022-02-19 LAB
ALBUMIN SERPL ELPH-MCNC: 2.6 G/DL — LOW (ref 3.3–5)
ALP SERPL-CCNC: 75 U/L — SIGNIFICANT CHANGE UP (ref 40–120)
ALT FLD-CCNC: 8 U/L — LOW (ref 12–78)
ANION GAP SERPL CALC-SCNC: 7 MMOL/L — SIGNIFICANT CHANGE UP (ref 5–17)
AST SERPL-CCNC: 14 U/L — LOW (ref 15–37)
BASOPHILS # BLD AUTO: 0.03 K/UL — SIGNIFICANT CHANGE UP (ref 0–0.2)
BASOPHILS NFR BLD AUTO: 0.4 % — SIGNIFICANT CHANGE UP (ref 0–2)
BILIRUB SERPL-MCNC: 0.2 MG/DL — SIGNIFICANT CHANGE UP (ref 0.2–1.2)
BUN SERPL-MCNC: 19 MG/DL — SIGNIFICANT CHANGE UP (ref 7–23)
CALCIUM SERPL-MCNC: 8.1 MG/DL — LOW (ref 8.5–10.1)
CHLORIDE SERPL-SCNC: 108 MMOL/L — SIGNIFICANT CHANGE UP (ref 96–108)
CO2 SERPL-SCNC: 26 MMOL/L — SIGNIFICANT CHANGE UP (ref 22–31)
CREAT SERPL-MCNC: 0.49 MG/DL — LOW (ref 0.5–1.3)
CULTURE RESULTS: SIGNIFICANT CHANGE UP
EOSINOPHIL # BLD AUTO: 0 K/UL — SIGNIFICANT CHANGE UP (ref 0–0.5)
EOSINOPHIL NFR BLD AUTO: 0 % — SIGNIFICANT CHANGE UP (ref 0–6)
GLUCOSE SERPL-MCNC: 108 MG/DL — HIGH (ref 70–99)
HCT VFR BLD CALC: 36.1 % — SIGNIFICANT CHANGE UP (ref 34.5–45)
HGB BLD-MCNC: 11.7 G/DL — SIGNIFICANT CHANGE UP (ref 11.5–15.5)
IMM GRANULOCYTES NFR BLD AUTO: 0.4 % — SIGNIFICANT CHANGE UP (ref 0–1.5)
LYMPHOCYTES # BLD AUTO: 0.73 K/UL — LOW (ref 1–3.3)
LYMPHOCYTES # BLD AUTO: 9.2 % — LOW (ref 13–44)
MAGNESIUM SERPL-MCNC: 2 MG/DL — SIGNIFICANT CHANGE UP (ref 1.6–2.6)
MCHC RBC-ENTMCNC: 30.4 PG — SIGNIFICANT CHANGE UP (ref 27–34)
MCHC RBC-ENTMCNC: 32.4 GM/DL — SIGNIFICANT CHANGE UP (ref 32–36)
MCV RBC AUTO: 93.8 FL — SIGNIFICANT CHANGE UP (ref 80–100)
MONOCYTES # BLD AUTO: 0.75 K/UL — SIGNIFICANT CHANGE UP (ref 0–0.9)
MONOCYTES NFR BLD AUTO: 9.5 % — SIGNIFICANT CHANGE UP (ref 2–14)
NEUTROPHILS # BLD AUTO: 6.36 K/UL — SIGNIFICANT CHANGE UP (ref 1.8–7.4)
NEUTROPHILS NFR BLD AUTO: 80.5 % — HIGH (ref 43–77)
PHOSPHATE SERPL-MCNC: 2.3 MG/DL — LOW (ref 2.5–4.5)
PLATELET # BLD AUTO: 197 K/UL — SIGNIFICANT CHANGE UP (ref 150–400)
POTASSIUM SERPL-MCNC: 3.1 MMOL/L — LOW (ref 3.5–5.3)
POTASSIUM SERPL-SCNC: 3.1 MMOL/L — LOW (ref 3.5–5.3)
PROT SERPL-MCNC: 5.8 GM/DL — LOW (ref 6–8.3)
RBC # BLD: 3.85 M/UL — SIGNIFICANT CHANGE UP (ref 3.8–5.2)
RBC # FLD: 13.2 % — SIGNIFICANT CHANGE UP (ref 10.3–14.5)
SODIUM SERPL-SCNC: 141 MMOL/L — SIGNIFICANT CHANGE UP (ref 135–145)
SPECIMEN SOURCE: SIGNIFICANT CHANGE UP
TSH SERPL-MCNC: 1.69 UU/ML — SIGNIFICANT CHANGE UP (ref 0.34–4.82)
WBC # BLD: 7.9 K/UL — SIGNIFICANT CHANGE UP (ref 3.8–10.5)
WBC # FLD AUTO: 7.9 K/UL — SIGNIFICANT CHANGE UP (ref 3.8–10.5)

## 2022-02-19 PROCEDURE — 99232 SBSQ HOSP IP/OBS MODERATE 35: CPT

## 2022-02-19 RX ORDER — DEXTROSE MONOHYDRATE, SODIUM CHLORIDE, AND POTASSIUM CHLORIDE 50; .745; 4.5 G/1000ML; G/1000ML; G/1000ML
1000 INJECTION, SOLUTION INTRAVENOUS
Refills: 0 | Status: DISCONTINUED | OUTPATIENT
Start: 2022-02-19 | End: 2022-02-20

## 2022-02-19 RX ORDER — SODIUM CHLORIDE 9 MG/ML
500 INJECTION INTRAMUSCULAR; INTRAVENOUS; SUBCUTANEOUS
Refills: 0 | Status: COMPLETED | OUTPATIENT
Start: 2022-02-19 | End: 2022-02-19

## 2022-02-19 RX ORDER — CIPROFLOXACIN LACTATE 400MG/40ML
400 VIAL (ML) INTRAVENOUS EVERY 12 HOURS
Refills: 0 | Status: DISCONTINUED | OUTPATIENT
Start: 2022-02-19 | End: 2022-02-19

## 2022-02-19 RX ORDER — POTASSIUM PHOSPHATE, MONOBASIC POTASSIUM PHOSPHATE, DIBASIC 236; 224 MG/ML; MG/ML
15 INJECTION, SOLUTION INTRAVENOUS ONCE
Refills: 0 | Status: COMPLETED | OUTPATIENT
Start: 2022-02-19 | End: 2022-02-19

## 2022-02-19 RX ADMIN — Medication 0.5 MILLIGRAM(S): at 21:38

## 2022-02-19 RX ADMIN — CARBIDOPA AND LEVODOPA 1 TABLET(S): 25; 100 TABLET ORAL at 21:39

## 2022-02-19 RX ADMIN — AMLODIPINE BESYLATE 2.5 MILLIGRAM(S): 2.5 TABLET ORAL at 10:24

## 2022-02-19 RX ADMIN — ATENOLOL 50 MILLIGRAM(S): 25 TABLET ORAL at 10:23

## 2022-02-19 RX ADMIN — LOSARTAN POTASSIUM 25 MILLIGRAM(S): 100 TABLET, FILM COATED ORAL at 21:44

## 2022-02-19 RX ADMIN — DEXTROSE MONOHYDRATE, SODIUM CHLORIDE, AND POTASSIUM CHLORIDE 75 MILLILITER(S): 50; .745; 4.5 INJECTION, SOLUTION INTRAVENOUS at 13:55

## 2022-02-19 RX ADMIN — LOSARTAN POTASSIUM 25 MILLIGRAM(S): 100 TABLET, FILM COATED ORAL at 10:24

## 2022-02-19 RX ADMIN — ENOXAPARIN SODIUM 40 MILLIGRAM(S): 100 INJECTION SUBCUTANEOUS at 10:24

## 2022-02-19 RX ADMIN — PANTOPRAZOLE SODIUM 40 MILLIGRAM(S): 20 TABLET, DELAYED RELEASE ORAL at 10:25

## 2022-02-19 RX ADMIN — ATENOLOL 50 MILLIGRAM(S): 25 TABLET ORAL at 21:44

## 2022-02-19 RX ADMIN — SODIUM CHLORIDE 75 MILLILITER(S): 9 INJECTION INTRAMUSCULAR; INTRAVENOUS; SUBCUTANEOUS at 06:07

## 2022-02-19 RX ADMIN — CARBIDOPA AND LEVODOPA 1 TABLET(S): 25; 100 TABLET ORAL at 10:23

## 2022-02-19 RX ADMIN — POTASSIUM PHOSPHATE, MONOBASIC POTASSIUM PHOSPHATE, DIBASIC 62.5 MILLIMOLE(S): 236; 224 INJECTION, SOLUTION INTRAVENOUS at 13:55

## 2022-02-19 RX ADMIN — PANTOPRAZOLE SODIUM 40 MILLIGRAM(S): 20 TABLET, DELAYED RELEASE ORAL at 21:39

## 2022-02-19 RX ADMIN — Medication 650 MILLIGRAM(S): at 17:20

## 2022-02-19 NOTE — PROGRESS NOTE ADULT - ASSESSMENT
MEDICATIONS  (STANDING):  ALPRAZolam 0.5 milliGRAM(s) Oral at bedtime  amLODIPine   Tablet 2.5 milliGRAM(s) Oral daily  ATENolol  Tablet 50 milliGRAM(s) Oral every 12 hours  carbidopa/levodopa  25/100 1 Tablet(s) Oral <User Schedule>  enoxaparin Injectable 40 milliGRAM(s) SubCutaneous daily  losartan 25 milliGRAM(s) Oral two times a day  pantoprazole  Injectable 40 milliGRAM(s) IV Push every 12 hours    MEDICATIONS  (PRN):  acetaminophen     Tablet .. 650 milliGRAM(s) Oral every 6 hours PRN Temp greater or equal to 38C (100.4F), Mild Pain (1 - 3)  aluminum hydroxide/magnesium hydroxide/simethicone Suspension 30 milliLiter(s) Oral every 4 hours PRN Dyspepsia  melatonin 3 milliGRAM(s) Oral at bedtime PRN Insomnia  ondansetron Injectable 4 milliGRAM(s) IV Push every 8 hours PRN Nausea and/or Vomiting      ASSESSMENT/PLAN    Ileus vs Possible early SBO/Partial SBO. Improving  Reported dark hematemesis x 1 with stable Hgb. R/O Upper GIB  Associated intractable nausea/vomiting due to above  Diarrhea likely secondary to laxative. C Diff/GI PCR pending  -Admit to medicine  -Resolving symptoms  -NPO except for meds  -PPI  -GI/Surgery consult  -Advance diet as per GI/Surgery  -Pain control/bowel regimen   -Continue to monitor Hgb  -Associated electrolyte disturbances due to above. Continue to monitor and replace electrolytes accordingly    Parkinson's   -Home meds    HTN. Uncontrolled  -Home Rx. Titrate accordingly  -PRN Rx for elevated BP    DVT Prophylaxis: Lovenox subq    Goals of Care (GOC)/Advance Care Planning (ACP)  Date of Discussion/evaluation: 2/18/2022  Code Status: Full Code  HCP: 2 Daughters: Key Hernandez ([phone]) and Maria Fernanda Sullivan ([phone])  Alternative Feeding methods: Would like to discuss or assess should the situation arise that it requires alternative feeding methods  Blood Product Transfusions: YES agreeable  Pressors: YES agreeable   MEDICATIONS  (STANDING):  ALPRAZolam 0.5 milliGRAM(s) Oral at bedtime  amLODIPine   Tablet 2.5 milliGRAM(s) Oral daily  ATENolol  Tablet 50 milliGRAM(s) Oral every 12 hours  carbidopa/levodopa  25/100 1 Tablet(s) Oral <User Schedule>  enoxaparin Injectable 40 milliGRAM(s) SubCutaneous daily  losartan 25 milliGRAM(s) Oral two times a day  pantoprazole  Injectable 40 milliGRAM(s) IV Push every 12 hours    MEDICATIONS  (PRN):  acetaminophen     Tablet .. 650 milliGRAM(s) Oral every 6 hours PRN Temp greater or equal to 38C (100.4F), Mild Pain (1 - 3)  aluminum hydroxide/magnesium hydroxide/simethicone Suspension 30 milliLiter(s) Oral every 4 hours PRN Dyspepsia  melatonin 3 milliGRAM(s) Oral at bedtime PRN Insomnia  ondansetron Injectable 4 milliGRAM(s) IV Push every 8 hours PRN Nausea and/or Vomiting      ASSESSMENT/PLAN    Ileus vs Possible early SBO/Partial SBO. Improving  Reported dark hematemesis x 1 with stable Hgb. R/O Upper GIB  Associated intractable nausea/vomiting due to above  Diarrhea likely secondary to laxative. C Diff/GI PCR pending  -Admit to medicine  -Resolving symptoms  -NPO except for meds  -PPI  -GI/Surgery consult  -Advance diet as per GI/Surgery  -Pain control/bowel regimen   -Continue to monitor Hgb  -Associated electrolyte disturbances due to above. Continue to monitor and replace electrolytes accordingly    Parkinson's   -Home meds    HTN. Uncontrolled  -Home Rx. Titrate accordingly  -PRN Rx for elevated BP    Asymptomatic Bacteruria  -Nausea and vomiting likely from ileus/SBO  -UA suggestive of UTI on admission  -Cultures pending  -No dysuria or change in urinary frequency or change in mentation  -Cipro x 1 administered in ER. No leukocytosis and afebrile. Observe off antibiotics.     DVT Prophylaxis: Lovenox subq    Goals of Care (GOC)/Advance Care Planning (ACP)  Date of Discussion/evaluation: 2/18/2022  Code Status: Full Code  HCP: 2 Daughters: Key Hernandez ([phone]) and Maria Fernanda Sullivan ([phone])  Alternative Feeding methods: Would like to discuss or assess should the situation arise that it requires alternative feeding methods  Blood Product Transfusions: YES agreeable  Pressors: YES agreeable

## 2022-02-19 NOTE — PHYSICAL THERAPY INITIAL EVALUATION ADULT - PERTINENT HX OF CURRENT PROBLEM, REHAB EVAL
87 y/o F with PMHx of CAD on Plavix, anemia, Parkinson's, HTN, anxiety, presents to ED from [facility] because of intractable nausea and vomiting and intermittent abdominal pain x 1-2 days. Initially bilious non-bloody. Administered anti-emetics in the ER with some improvement but then administered bactrim and given something to eat prior to ER discharge adn reported dark coffee ground emesis x 1 and unable to tolerate PO.

## 2022-02-19 NOTE — PHYSICAL THERAPY INITIAL EVALUATION ADULT - ADDITIONAL COMMENTS
Pt states she lives in [facility] facility. Pt states she can ambulate facility with a rollator walker. No steps within facility

## 2022-02-19 NOTE — PHYSICAL THERAPY INITIAL EVALUATION ADULT - GENERAL OBSERVATIONS, REHAB EVAL
Pt encountered supine in bed on [address]. Pt performed therapeutic exercise and bed mobility. Pt declined Transfer training and ambulation, Pt educated on importance of OOB and continued to decline because she felt she coulnt control her bowels. Pt returned to supine in bed in no acute distress, call bell within reach, bed alarm on.

## 2022-02-20 LAB
ANION GAP SERPL CALC-SCNC: 4 MMOL/L — LOW (ref 5–17)
BASOPHILS # BLD AUTO: 0.03 K/UL — SIGNIFICANT CHANGE UP (ref 0–0.2)
BASOPHILS NFR BLD AUTO: 0.6 % — SIGNIFICANT CHANGE UP (ref 0–2)
BUN SERPL-MCNC: 10 MG/DL — SIGNIFICANT CHANGE UP (ref 7–23)
C DIFF BY PCR RESULT: SIGNIFICANT CHANGE UP
C DIFF TOX GENS STL QL NAA+PROBE: SIGNIFICANT CHANGE UP
CALCIUM SERPL-MCNC: 8 MG/DL — LOW (ref 8.5–10.1)
CHLORIDE SERPL-SCNC: 107 MMOL/L — SIGNIFICANT CHANGE UP (ref 96–108)
CO2 SERPL-SCNC: 28 MMOL/L — SIGNIFICANT CHANGE UP (ref 22–31)
CREAT SERPL-MCNC: 0.45 MG/DL — LOW (ref 0.5–1.3)
EOSINOPHIL # BLD AUTO: 0.02 K/UL — SIGNIFICANT CHANGE UP (ref 0–0.5)
EOSINOPHIL NFR BLD AUTO: 0.4 % — SIGNIFICANT CHANGE UP (ref 0–6)
FERRITIN SERPL-MCNC: 117 NG/ML — SIGNIFICANT CHANGE UP (ref 15–150)
FOLATE SERPL-MCNC: 19.1 NG/ML — SIGNIFICANT CHANGE UP
GLUCOSE SERPL-MCNC: 106 MG/DL — HIGH (ref 70–99)
HCT VFR BLD CALC: 33.3 % — LOW (ref 34.5–45)
HCT VFR BLD CALC: 37.1 % — SIGNIFICANT CHANGE UP (ref 34.5–45)
HGB BLD-MCNC: 10.9 G/DL — LOW (ref 11.5–15.5)
HGB BLD-MCNC: 12.1 G/DL — SIGNIFICANT CHANGE UP (ref 11.5–15.5)
IMM GRANULOCYTES NFR BLD AUTO: 0.2 % — SIGNIFICANT CHANGE UP (ref 0–1.5)
IRON SATN MFR SERPL: 14 UG/DL — LOW (ref 30–160)
IRON SATN MFR SERPL: 7 % — LOW (ref 14–50)
LYMPHOCYTES # BLD AUTO: 1.08 K/UL — SIGNIFICANT CHANGE UP (ref 1–3.3)
LYMPHOCYTES # BLD AUTO: 22.6 % — SIGNIFICANT CHANGE UP (ref 13–44)
MAGNESIUM SERPL-MCNC: 2.1 MG/DL — SIGNIFICANT CHANGE UP (ref 1.6–2.6)
MCHC RBC-ENTMCNC: 30.4 PG — SIGNIFICANT CHANGE UP (ref 27–34)
MCHC RBC-ENTMCNC: 32.7 GM/DL — SIGNIFICANT CHANGE UP (ref 32–36)
MCV RBC AUTO: 93 FL — SIGNIFICANT CHANGE UP (ref 80–100)
MONOCYTES # BLD AUTO: 0.77 K/UL — SIGNIFICANT CHANGE UP (ref 0–0.9)
MONOCYTES NFR BLD AUTO: 16.1 % — HIGH (ref 2–14)
NEUTROPHILS # BLD AUTO: 2.87 K/UL — SIGNIFICANT CHANGE UP (ref 1.8–7.4)
NEUTROPHILS NFR BLD AUTO: 60.1 % — SIGNIFICANT CHANGE UP (ref 43–77)
PHOSPHATE SERPL-MCNC: 1.8 MG/DL — LOW (ref 2.5–4.5)
PLATELET # BLD AUTO: 180 K/UL — SIGNIFICANT CHANGE UP (ref 150–400)
POTASSIUM SERPL-MCNC: 3.6 MMOL/L — SIGNIFICANT CHANGE UP (ref 3.5–5.3)
POTASSIUM SERPL-SCNC: 3.6 MMOL/L — SIGNIFICANT CHANGE UP (ref 3.5–5.3)
RBC # BLD: 3.58 M/UL — LOW (ref 3.8–5.2)
RBC # FLD: 13.1 % — SIGNIFICANT CHANGE UP (ref 10.3–14.5)
SODIUM SERPL-SCNC: 139 MMOL/L — SIGNIFICANT CHANGE UP (ref 135–145)
TIBC SERPL-MCNC: 205 UG/DL — LOW (ref 220–430)
UIBC SERPL-MCNC: 191 UG/DL — SIGNIFICANT CHANGE UP (ref 110–370)
VIT B12 SERPL-MCNC: 1181 PG/ML — SIGNIFICANT CHANGE UP (ref 232–1245)
WBC # BLD: 4.78 K/UL — SIGNIFICANT CHANGE UP (ref 3.8–10.5)
WBC # FLD AUTO: 4.78 K/UL — SIGNIFICANT CHANGE UP (ref 3.8–10.5)

## 2022-02-20 PROCEDURE — 99232 SBSQ HOSP IP/OBS MODERATE 35: CPT

## 2022-02-20 RX ORDER — POTASSIUM PHOSPHATE, MONOBASIC POTASSIUM PHOSPHATE, DIBASIC 236; 224 MG/ML; MG/ML
30 INJECTION, SOLUTION INTRAVENOUS ONCE
Refills: 0 | Status: COMPLETED | OUTPATIENT
Start: 2022-02-20 | End: 2022-02-20

## 2022-02-20 RX ORDER — HYDRALAZINE HCL 50 MG
25 TABLET ORAL ONCE
Refills: 0 | Status: COMPLETED | OUTPATIENT
Start: 2022-02-20 | End: 2022-02-20

## 2022-02-20 RX ORDER — HYDRALAZINE HCL 50 MG
25 TABLET ORAL EVERY 6 HOURS
Refills: 0 | Status: DISCONTINUED | OUTPATIENT
Start: 2022-02-20 | End: 2022-02-22

## 2022-02-20 RX ADMIN — DEXTROSE MONOHYDRATE, SODIUM CHLORIDE, AND POTASSIUM CHLORIDE 75 MILLILITER(S): 50; .745; 4.5 INJECTION, SOLUTION INTRAVENOUS at 02:24

## 2022-02-20 RX ADMIN — CARBIDOPA AND LEVODOPA 1 TABLET(S): 25; 100 TABLET ORAL at 22:16

## 2022-02-20 RX ADMIN — PANTOPRAZOLE SODIUM 40 MILLIGRAM(S): 20 TABLET, DELAYED RELEASE ORAL at 22:17

## 2022-02-20 RX ADMIN — PANTOPRAZOLE SODIUM 40 MILLIGRAM(S): 20 TABLET, DELAYED RELEASE ORAL at 09:08

## 2022-02-20 RX ADMIN — POTASSIUM PHOSPHATE, MONOBASIC POTASSIUM PHOSPHATE, DIBASIC 83.33 MILLIMOLE(S): 236; 224 INJECTION, SOLUTION INTRAVENOUS at 09:08

## 2022-02-20 RX ADMIN — ATENOLOL 50 MILLIGRAM(S): 25 TABLET ORAL at 09:09

## 2022-02-20 RX ADMIN — Medication 25 MILLIGRAM(S): at 17:05

## 2022-02-20 RX ADMIN — ENOXAPARIN SODIUM 40 MILLIGRAM(S): 100 INJECTION SUBCUTANEOUS at 09:08

## 2022-02-20 RX ADMIN — LOSARTAN POTASSIUM 25 MILLIGRAM(S): 100 TABLET, FILM COATED ORAL at 22:17

## 2022-02-20 RX ADMIN — LOSARTAN POTASSIUM 25 MILLIGRAM(S): 100 TABLET, FILM COATED ORAL at 09:09

## 2022-02-20 RX ADMIN — Medication 3 MILLIGRAM(S): at 22:20

## 2022-02-20 RX ADMIN — Medication 0.5 MILLIGRAM(S): at 22:16

## 2022-02-20 RX ADMIN — CARBIDOPA AND LEVODOPA 1 TABLET(S): 25; 100 TABLET ORAL at 09:08

## 2022-02-20 RX ADMIN — AMLODIPINE BESYLATE 2.5 MILLIGRAM(S): 2.5 TABLET ORAL at 09:09

## 2022-02-20 RX ADMIN — ATENOLOL 50 MILLIGRAM(S): 25 TABLET ORAL at 22:16

## 2022-02-20 NOTE — PROGRESS NOTE ADULT - ASSESSMENT
MEDICATIONS  (STANDING):  ALPRAZolam 0.5 milliGRAM(s) Oral at bedtime  amLODIPine   Tablet 2.5 milliGRAM(s) Oral daily  ATENolol  Tablet 50 milliGRAM(s) Oral every 12 hours  carbidopa/levodopa  25/100 1 Tablet(s) Oral <User Schedule>  enoxaparin Injectable 40 milliGRAM(s) SubCutaneous daily  losartan 25 milliGRAM(s) Oral two times a day  pantoprazole  Injectable 40 milliGRAM(s) IV Push every 12 hours    MEDICATIONS  (PRN):  acetaminophen     Tablet .. 650 milliGRAM(s) Oral every 6 hours PRN Temp greater or equal to 38C (100.4F), Mild Pain (1 - 3)  aluminum hydroxide/magnesium hydroxide/simethicone Suspension 30 milliLiter(s) Oral every 4 hours PRN Dyspepsia  melatonin 3 milliGRAM(s) Oral at bedtime PRN Insomnia  ondansetron Injectable 4 milliGRAM(s) IV Push every 8 hours PRN Nausea and/or Vomiting      ASSESSMENT/PLAN    Sepsis Secondary to Acute Gastroenteritis with Rotavirus (POA) with associated tachycardia/tachypnea  Ileus vs Possible early SBO/Partial SBO. Improving  Reported dark hematemesis x 1 with stable Hgb. R/O Upper GIB  Associated intractable nausea/vomiting due to above  Anemia without any further overt signs of bleeding. Likely hemodilutional  -Admit to medicine  -Resolving symptoms  -NPO except for meds initially. Advancing diet as tolerated  -PPI  -GI/Surgery consult  -Advance diet as per GI/Surgery  -Pain control/bowel regimen   -Continue to monitor Hgb. Drop from 13.1 to 10.7 could be dilution. No overt signs of bleeding except for dark hematemesis x 1 in ER. DC IVF and continue to monitor Hgb. If continued drop may require procedure with GI. Check Iron/Ferritin/B12/Folate levels.   -Associated electrolyte disturbances due to above. Continue to monitor and replace electrolytes accordingly    Parkinson's   -Home meds    HTN. Uncontrolled  -Home Rx. Titrate accordingly  -PRN Rx for elevated BP    Asymptomatic Bacteruria  -Nausea and vomiting likely from ileus/SBO  -UA suggestive of UTI on admission  -Cultures pending  -No dysuria or change in urinary frequency or change in mentation  -Cipro x 1 administered in ER. No leukocytosis and afebrile. Observe off antibiotics.     DVT Prophylaxis: Lovenox subq    Goals of Care (GOC)/Advance Care Planning (ACP)  Date of Discussion/evaluation: 2/18/2022  Code Status: Full Code  HCP: 2 Daughters: Key Hernandez ([phone]) and Maria Fernanda Sullivan ([phone])  Alternative Feeding methods: Would like to discuss or assess should the situation arise that it requires alternative feeding methods  Blood Product Transfusions: YES agreeable  Pressors: YES agreeable

## 2022-02-21 LAB
-  AMIKACIN: SIGNIFICANT CHANGE UP
-  AMOXICILLIN/CLAVULANIC ACID: SIGNIFICANT CHANGE UP
-  AMPICILLIN/SULBACTAM: SIGNIFICANT CHANGE UP
-  AMPICILLIN: SIGNIFICANT CHANGE UP
-  AZTREONAM: SIGNIFICANT CHANGE UP
-  CEFAZOLIN: SIGNIFICANT CHANGE UP
-  CEFEPIME: SIGNIFICANT CHANGE UP
-  CEFOXITIN: SIGNIFICANT CHANGE UP
-  CEFTRIAXONE: SIGNIFICANT CHANGE UP
-  CIPROFLOXACIN: SIGNIFICANT CHANGE UP
-  ERTAPENEM: SIGNIFICANT CHANGE UP
-  GENTAMICIN: SIGNIFICANT CHANGE UP
-  IMIPENEM: SIGNIFICANT CHANGE UP
-  LEVOFLOXACIN: SIGNIFICANT CHANGE UP
-  MEROPENEM: SIGNIFICANT CHANGE UP
-  NITROFURANTOIN: SIGNIFICANT CHANGE UP
-  PIPERACILLIN/TAZOBACTAM: SIGNIFICANT CHANGE UP
-  TIGECYCLINE: SIGNIFICANT CHANGE UP
-  TOBRAMYCIN: SIGNIFICANT CHANGE UP
-  TRIMETHOPRIM/SULFAMETHOXAZOLE: SIGNIFICANT CHANGE UP
ANION GAP SERPL CALC-SCNC: 6 MMOL/L — SIGNIFICANT CHANGE UP (ref 5–17)
BASOPHILS # BLD AUTO: 0.03 K/UL — SIGNIFICANT CHANGE UP (ref 0–0.2)
BASOPHILS NFR BLD AUTO: 0.6 % — SIGNIFICANT CHANGE UP (ref 0–2)
BUN SERPL-MCNC: 11 MG/DL — SIGNIFICANT CHANGE UP (ref 7–23)
CALCIUM SERPL-MCNC: 8.2 MG/DL — LOW (ref 8.5–10.1)
CHLORIDE SERPL-SCNC: 105 MMOL/L — SIGNIFICANT CHANGE UP (ref 96–108)
CO2 SERPL-SCNC: 27 MMOL/L — SIGNIFICANT CHANGE UP (ref 22–31)
CREAT SERPL-MCNC: 0.38 MG/DL — LOW (ref 0.5–1.3)
CULTURE RESULTS: SIGNIFICANT CHANGE UP
EOSINOPHIL # BLD AUTO: 0.01 K/UL — SIGNIFICANT CHANGE UP (ref 0–0.5)
EOSINOPHIL NFR BLD AUTO: 0.2 % — SIGNIFICANT CHANGE UP (ref 0–6)
GLUCOSE SERPL-MCNC: 102 MG/DL — HIGH (ref 70–99)
HCT VFR BLD CALC: 36.2 % — SIGNIFICANT CHANGE UP (ref 34.5–45)
HGB BLD-MCNC: 12.1 G/DL — SIGNIFICANT CHANGE UP (ref 11.5–15.5)
IMM GRANULOCYTES NFR BLD AUTO: 0.2 % — SIGNIFICANT CHANGE UP (ref 0–1.5)
LYMPHOCYTES # BLD AUTO: 1.25 K/UL — SIGNIFICANT CHANGE UP (ref 1–3.3)
LYMPHOCYTES # BLD AUTO: 26.2 % — SIGNIFICANT CHANGE UP (ref 13–44)
MAGNESIUM SERPL-MCNC: 1.9 MG/DL — SIGNIFICANT CHANGE UP (ref 1.6–2.6)
MCHC RBC-ENTMCNC: 30.5 PG — SIGNIFICANT CHANGE UP (ref 27–34)
MCHC RBC-ENTMCNC: 33.4 GM/DL — SIGNIFICANT CHANGE UP (ref 32–36)
MCV RBC AUTO: 91.2 FL — SIGNIFICANT CHANGE UP (ref 80–100)
METHOD TYPE: SIGNIFICANT CHANGE UP
MONOCYTES # BLD AUTO: 0.75 K/UL — SIGNIFICANT CHANGE UP (ref 0–0.9)
MONOCYTES NFR BLD AUTO: 15.7 % — HIGH (ref 2–14)
NEUTROPHILS # BLD AUTO: 2.72 K/UL — SIGNIFICANT CHANGE UP (ref 1.8–7.4)
NEUTROPHILS NFR BLD AUTO: 57.1 % — SIGNIFICANT CHANGE UP (ref 43–77)
ORGANISM # SPEC MICROSCOPIC CNT: SIGNIFICANT CHANGE UP
ORGANISM # SPEC MICROSCOPIC CNT: SIGNIFICANT CHANGE UP
PHOSPHATE SERPL-MCNC: 2.9 MG/DL — SIGNIFICANT CHANGE UP (ref 2.5–4.5)
PLATELET # BLD AUTO: 197 K/UL — SIGNIFICANT CHANGE UP (ref 150–400)
POTASSIUM SERPL-MCNC: 3.2 MMOL/L — LOW (ref 3.5–5.3)
POTASSIUM SERPL-SCNC: 3.2 MMOL/L — LOW (ref 3.5–5.3)
RBC # BLD: 3.97 M/UL — SIGNIFICANT CHANGE UP (ref 3.8–5.2)
RBC # FLD: 12.9 % — SIGNIFICANT CHANGE UP (ref 10.3–14.5)
SARS-COV-2 RNA SPEC QL NAA+PROBE: SIGNIFICANT CHANGE UP
SODIUM SERPL-SCNC: 138 MMOL/L — SIGNIFICANT CHANGE UP (ref 135–145)
SPECIMEN SOURCE: SIGNIFICANT CHANGE UP
WBC # BLD: 4.77 K/UL — SIGNIFICANT CHANGE UP (ref 3.8–10.5)
WBC # FLD AUTO: 4.77 K/UL — SIGNIFICANT CHANGE UP (ref 3.8–10.5)

## 2022-02-21 PROCEDURE — 99232 SBSQ HOSP IP/OBS MODERATE 35: CPT

## 2022-02-21 RX ORDER — ZINC OXIDE 200 MG/G
1 OINTMENT TOPICAL
Refills: 0 | Status: DISCONTINUED | OUTPATIENT
Start: 2022-02-21 | End: 2022-02-22

## 2022-02-21 RX ORDER — POTASSIUM CHLORIDE 20 MEQ
40 PACKET (EA) ORAL ONCE
Refills: 0 | Status: COMPLETED | OUTPATIENT
Start: 2022-02-21 | End: 2022-02-21

## 2022-02-21 RX ORDER — IRON SUCROSE 20 MG/ML
200 INJECTION, SOLUTION INTRAVENOUS ONCE
Refills: 0 | Status: COMPLETED | OUTPATIENT
Start: 2022-02-21 | End: 2022-02-21

## 2022-02-21 RX ORDER — LOPERAMIDE HCL 2 MG
2 TABLET ORAL EVERY 6 HOURS
Refills: 0 | Status: DISCONTINUED | OUTPATIENT
Start: 2022-02-21 | End: 2022-02-22

## 2022-02-21 RX ADMIN — PANTOPRAZOLE SODIUM 40 MILLIGRAM(S): 20 TABLET, DELAYED RELEASE ORAL at 09:15

## 2022-02-21 RX ADMIN — PANTOPRAZOLE SODIUM 40 MILLIGRAM(S): 20 TABLET, DELAYED RELEASE ORAL at 21:29

## 2022-02-21 RX ADMIN — Medication 25 MILLIGRAM(S): at 09:14

## 2022-02-21 RX ADMIN — AMLODIPINE BESYLATE 2.5 MILLIGRAM(S): 2.5 TABLET ORAL at 09:15

## 2022-02-21 RX ADMIN — ENOXAPARIN SODIUM 40 MILLIGRAM(S): 100 INJECTION SUBCUTANEOUS at 09:15

## 2022-02-21 RX ADMIN — Medication 0.5 MILLIGRAM(S): at 21:29

## 2022-02-21 RX ADMIN — ATENOLOL 50 MILLIGRAM(S): 25 TABLET ORAL at 21:29

## 2022-02-21 RX ADMIN — IRON SUCROSE 200 MILLIGRAM(S): 20 INJECTION, SOLUTION INTRAVENOUS at 10:30

## 2022-02-21 RX ADMIN — CARBIDOPA AND LEVODOPA 1 TABLET(S): 25; 100 TABLET ORAL at 21:29

## 2022-02-21 RX ADMIN — CARBIDOPA AND LEVODOPA 1 TABLET(S): 25; 100 TABLET ORAL at 09:14

## 2022-02-21 RX ADMIN — Medication 40 MILLIEQUIVALENT(S): at 09:14

## 2022-02-21 RX ADMIN — LOSARTAN POTASSIUM 25 MILLIGRAM(S): 100 TABLET, FILM COATED ORAL at 21:30

## 2022-02-21 RX ADMIN — LOSARTAN POTASSIUM 25 MILLIGRAM(S): 100 TABLET, FILM COATED ORAL at 09:13

## 2022-02-21 RX ADMIN — ATENOLOL 50 MILLIGRAM(S): 25 TABLET ORAL at 09:14

## 2022-02-21 RX ADMIN — Medication 25 MILLIGRAM(S): at 17:51

## 2022-02-21 RX ADMIN — Medication 2 MILLIGRAM(S): at 17:51

## 2022-02-21 NOTE — PROGRESS NOTE ADULT - SUBJECTIVE AND OBJECTIVE BOX
CHIEF COMPLAINT: Abdominal Pain/Nausea/Vomiting    SUBJECTIVE: Diarrhea x 2 yesterday and another 2x overnight. C Diff ordered and pending. Abdominal pain improved. Still weak and tired from baseline. Nausea improved. No further episodes of vomiting.     SIGNIFICANT INTERVAL EVENTS/OVERNIGHT EVENTS: None    Review of Systems: 14 Point review of systems reviewed and reported as negative unless otherwise stated in HPI      PHYSICAL EXAM:    T(C): 37.4 (02-19-22 @ 09:28), Max: 38.2 (02-18-22 @ 21:20)  HR: 78 (02-19-22 @ 09:28) (78 - 103)  BP: 159/57 (02-19-22 @ 09:28) (151/60 - 174/66)  RR: 19 (02-19-22 @ 09:28) (19 - 23)  SpO2: 94% (02-19-22 @ 09:28) (94% - 100%)    General: AAOx3; NAD  Head: AT/NC  ENT: Dry Mucous Membranes; No Injury  Eyes: EOMI; PERRL; Baseline Left eye lid lag  Neck: Non-tender; No JVD  CVS: RRR, S1&S2, No murmur,   Respiratory: Lungs CTA B/L; Normal Respiratory Effort  Abdomen/GI: Soft, non-tender, non-distended, no guarding, no rebound, normal bowel sounds  : No bladder distention  Extremities: No cyanosis, No clubbing,  MSK: No CVA tenderness, Normal ROM, No injury  Neuro: AAOx3, CNII-XII grossly intact (except baseline left eye lid lag), non-focal  Psych: Appropriate, Cooperative, Flat affect  Skin: Clean, Dry and Intact      LABS:                          11.7   7.90  )-----------( 197      ( 19 Feb 2022 10:45 )             36.1     02-19    141  |  108  |  19  ----------------------------<  108<H>  3.1<L>   |  26  |  0.49<L>    Ca    8.1<L>      19 Feb 2022 10:45  Phos  2.3     02-19  Mg     2.0     02-19    TPro  5.8<L>  /  Alb  2.6<L>  /  TBili  0.2  /  DBili  x   /  AST  14<L>  /  ALT  8<L>  /  AlkPhos  75  02-19    COVID-19 PCR: NotDetec (18 Feb 2022 10:40)    CAPILLARY BLOOD GLUCOSE    Thyroid Stimulating Hormone, Serum: 1.69 uU/mL (02.19.22 @ 10:45) Lactate, Blood: 0.7 mmol/L (02.18.22 @ 15:31) Lipase, Serum: 111 U/L (02.18.22 @ 10:33)           RADIOLOGY:  `< from: CT Abdomen and Pelvis w/ IV Cont (02.18.22 @ 12:09) >  IMPRESSION:  Fluid-filled dilatation of the small and large bowel without transition   point or obstructing mass identified.    Findings could be due to ileus, however obstruction is not excluded.    < end of copied text >            I personally reviewed labs, imaging, ekg, orders and vitals.    Discussed case with:  [X]RN  []CM/SW  [X]Patient  [X]Family  []Specialist:        MEDICATIONS  (STANDING):  ALPRAZolam 0.5 milliGRAM(s) Oral at bedtime  amLODIPine   Tablet 2.5 milliGRAM(s) Oral daily  ATENolol  Tablet 50 milliGRAM(s) Oral every 12 hours  carbidopa/levodopa  25/100 1 Tablet(s) Oral <User Schedule>  dextrose 5% + sodium chloride 0.45% with potassium chloride 20 mEq/L 1000 milliLiter(s) (75 mL/Hr) IV Continuous <Continuous>  enoxaparin Injectable 40 milliGRAM(s) SubCutaneous daily  losartan 25 milliGRAM(s) Oral two times a day  pantoprazole  Injectable 40 milliGRAM(s) IV Push every 12 hours  potassium phosphate IVPB 15 milliMole(s) IV Intermittent once    MEDICATIONS  (PRN):  acetaminophen     Tablet .. 650 milliGRAM(s) Oral every 6 hours PRN Temp greater or equal to 38C (100.4F), Mild Pain (1 - 3)  aluminum hydroxide/magnesium hydroxide/simethicone Suspension 30 milliLiter(s) Oral every 4 hours PRN Dyspepsia  melatonin 3 milliGRAM(s) Oral at bedtime PRN Insomnia  ondansetron Injectable 4 milliGRAM(s) IV Push every 8 hours PRN Nausea and/or Vomiting    
CHIEF COMPLAINT: Abdominal Pain/Nausea/Vomiting    SUBJECTIVE: 1 losse bowel movement overnight. No more nausea, vomiting, abdominal pain. Tolerating clear liquid diet    SIGNIFICANT INTERVAL EVENTS/OVERNIGHT EVENTS: None    Review of Systems: 14 Point review of systems reviewed and reported as negative unless otherwise stated in HPI      PHYSICAL EXAM:    T(C): 36.8 (02-20-22 @ 08:23), Max: 37.7 (02-19-22 @ 17:04)  HR: 75 (02-20-22 @ 08:23) (75 - 84)  BP: 156/57 (02-20-22 @ 08:23) (156/57 - 164/62)  RR: 18 (02-20-22 @ 08:23) (17 - 18)  SpO2: 95% (02-20-22 @ 08:23) (95% - 96%)    General: AAOx3; NAD  Head: AT/NC  ENT: Moist Mucous Membranes; No Injury  Eyes: EOMI; PERRL; Baseline Left eye lid lag  Neck: Non-tender; No JVD  CVS: RRR, S1&S2, No murmur,   Respiratory: Lungs CTA B/L; Normal Respiratory Effort  Abdomen/GI: Soft, non-tender, non-distended, no guarding, no rebound, normal bowel sounds  : No bladder distention  Extremities: No cyanosis, No clubbing,  MSK: No CVA tenderness, Normal ROM, No injury  Neuro: AAOx3, CNII-XII grossly intact (except baseline left eye lid lag), non-focal  Psych: Appropriate, Cooperative, Flat affect  Skin: Clean, Dry and Intact      LABS:                          10.9   4.78  )-----------( 180      ( 20 Feb 2022 06:33 )             33.3     02-20    139  |  107  |  10  ----------------------------<  106<H>  3.6   |  28  |  0.45<L>    Ca    8.0<L>      20 Feb 2022 06:33  Phos  1.8     02-20  Mg     2.1     02-20    TPro  5.8<L>  /  Alb  2.6<L>  /  TBili  0.2  /  DBili  x   /  AST  14<L>  /  ALT  8<L>  /  AlkPhos  75  02-19    COVID-19 PCR: NotDetec (18 Feb 2022 10:40)    CAPILLARY BLOOD GLUCOSE          GI PCR Panel, Stool (collected 19 Feb 2022 09:00)  Source: .Stool Feces  Final Report (19 Feb 2022 16:29):    Rotavirus A    DETECTED by PCR    *******Please Note:*******    GI panel PCR evaluates for:    Campylobacter, Plesiomonas shigelloides, Salmonella,    Vibrio, Yersinia enterocolitica, Enteroaggregative    Escherichia coli (EAEC), Enteropathogenic E.coli (EPEC),    Enterotoxigenic E. coli (ETEC) lt/st, Shiga-like    toxin-producing E. coli (STEC) stx1/stx2,    Shigella/ Enteroinvasive E. coli (EIEC), Cryptosporidium,    Cyclospora cayetanensis, Entamoeba histolytica,    Giardia lamblia, Adenovirus F 40/41, Astrovirus,    Norovirus GI/GII, Rotavirus A, Sapovirus    Culture - Blood (collected 18 Feb 2022 15:31)  Source: .Blood None  Preliminary Report (19 Feb 2022 22:01):    No growth to date.                            11.7   7.90  )-----------( 197      ( 19 Feb 2022 10:45 )             36.1     02-19    141  |  108  |  19  ----------------------------<  108<H>  3.1<L>   |  26  |  0.49<L>    Ca    8.1<L>      19 Feb 2022 10:45  Phos  2.3     02-19  Mg     2.0     02-19    TPro  5.8<L>  /  Alb  2.6<L>  /  TBili  0.2  /  DBili  x   /  AST  14<L>  /  ALT  8<L>  /  AlkPhos  75  02-19    COVID-19 PCR: NotDetec (18 Feb 2022 10:40)    CAPILLARY BLOOD GLUCOSE    Thyroid Stimulating Hormone, Serum: 1.69 uU/mL (02.19.22 @ 10:45) Lactate, Blood: 0.7 mmol/L (02.18.22 @ 15:31) Lipase, Serum: 111 U/L (02.18.22 @ 10:33)           RADIOLOGY:  `< from: CT Abdomen and Pelvis w/ IV Cont (02.18.22 @ 12:09) >  IMPRESSION:  Fluid-filled dilatation of the small and large bowel without transition   point or obstructing mass identified.    Findings could be due to ileus, however obstruction is not excluded.    < end of copied text >            I personally reviewed labs, imaging, ekg, orders and vitals.    Discussed case with:  [X]RN  []ZACK/SISSY  [X]Patient  [X]Family  []Specialist:            
CHIEF COMPLAINT: Abdominal Pain/Nausea/Vomiting    SUBJECTIVE: Diarrhea x 5 overnight. C Diff negative 2 days ago. Rotavirus positive. No more nausea, vomiting, abdominal pain. Tolerating diet; IAD from diarrhea    SIGNIFICANT INTERVAL EVENTS/OVERNIGHT EVENTS: None    Review of Systems: 14 Point review of systems reviewed and reported as negative unless otherwise stated in HPI      PHYSICAL EXAM:    T(C): 36.6 (02-21-22 @ 07:25), Max: 37.1 (02-20-22 @ 16:49)  HR: 66 (02-21-22 @ 07:25) (66 - 84)  BP: 145/51 (02-21-22 @ 07:25) (145/51 - 173/64)  RR: 17 (02-21-22 @ 07:25) (17 - 18)  SpO2: 96% (02-20-22 @ 22:10) (95% - 96%)    General: AAOx3; NAD  Head: AT/NC  ENT: Moist Mucous Membranes; No Injury  Eyes: EOMI; PERRL; Baseline Left eye lid lag  Neck: Non-tender; No JVD  CVS: RRR, S1&S2, No murmur,   Respiratory: Lungs CTA B/L; Normal Respiratory Effort  Abdomen/GI: Soft, non-tender, non-distended, no guarding, no rebound, normal bowel sounds  : No bladder distention  Extremities: No cyanosis, No clubbing,  MSK: No CVA tenderness, Normal ROM, No injury  Neuro: AAOx3, CNII-XII grossly intact (except baseline left eye lid lag), non-focal  Psych: Appropriate, Cooperative, Flat affect  Skin: Clean, Dry and Intact      LABS:                          12.1   4.77  )-----------( 197      ( 21 Feb 2022 07:30 )             36.2     02-21    138  |  105  |  11  ----------------------------<  102<H>  3.2<L>   |  27  |  0.38<L>    Ca    8.2<L>      21 Feb 2022 07:30  Phos  2.9     02-21  Mg     1.9     02-21    TPro  5.8<L>  /  Alb  2.6<L>  /  TBili  0.2  /  DBili  x   /  AST  14<L>  /  ALT  8<L>  /  AlkPhos  75  02-19    COVID-19 PCR: NotDetec (18 Feb 2022 10:40)    CAPILLARY BLOOD GLUCOSE          GI PCR Panel, Stool (collected 19 Feb 2022 09:00)  Source: .Stool Feces  Final Report (19 Feb 2022 16:29):    Rotavirus A    DETECTED by PCR    *******Please Note:*******    GI panel PCR evaluates for:    Campylobacter, Plesiomonas shigelloides, Salmonella,    Vibrio, Yersinia enterocolitica, Enteroaggregative    Escherichia coli (EAEC), Enteropathogenic E.coli (EPEC),    Enterotoxigenic E. coli (ETEC) lt/st, Shiga-like    toxin-producing E. coli (STEC) stx1/stx2,    Shigella/ Enteroinvasive E. coli (EIEC), Cryptosporidium,    Cyclospora cayetanensis, Entamoeba histolytica,    Giardia lamblia, Adenovirus F 40/41, Astrovirus,    Norovirus GI/GII, Rotavirus A, Sapovirus    Culture - Blood (collected 18 Feb 2022 15:31)  Source: .Blood None  Preliminary Report (19 Feb 2022 22:01):    No growth to date.    Culture - Urine (collected 18 Feb 2022 10:32)  Source: Clean Catch Clean Catch (Midstream)  Preliminary Report (20 Feb 2022 18:01):    >100,000 CFU/ml Escherichia coli                            10.9   4.78  )-----------( 180      ( 20 Feb 2022 06:33 )             33.3     02-20    139  |  107  |  10  ----------------------------<  106<H>  3.6   |  28  |  0.45<L>    Vitamin B12, Serum (02.20.22 @ 15:41)   Vitamin B12, Serum: 1181 pg/mL Iron with Total Binding Capacity (02.20.22 @ 15:41)   % Saturation, Iron: 7 %   Iron - Total Binding Capacity.: 205 ug/dL   Iron Total, Serum: 14 ug/dL   Unsaturated Iron Binding Capacity: 191 ug/dL Folate, Serum (02.20.22 @ 15:41)   Folate, Serum: 19.1 ng/mL Ferritin, Serum (02.20.22 @ 15:41)   Ferritin, Serum: 117 ng/mL Ca    8.0<L>      20 Feb 2022 06:33  Phos  1.8     02-20  Mg     2.1     02-20    TPro  5.8<L>  /  Alb  2.6<L>  /  TBili  0.2  /  DBili  x   /  AST  14<L>  /  ALT  8<L>  /  AlkPhos  75  02-19    COVID-19 PCR: NotDetec (18 Feb 2022 10:40)    CAPILLARY BLOOD GLUCOSE        Thyroid Stimulating Hormone, Serum: 1.69 uU/mL (02.19.22 @ 10:45) Lactate, Blood: 0.7 mmol/L (02.18.22 @ 15:31) Lipase, Serum: 111 U/L (02.18.22 @ 10:33)           RADIOLOGY:  `< from: CT Abdomen and Pelvis w/ IV Cont (02.18.22 @ 12:09) >  IMPRESSION:  Fluid-filled dilatation of the small and large bowel without transition   point or obstructing mass identified.    Findings could be due to ileus, however obstruction is not excluded.    < end of copied text >            I personally reviewed labs, imaging,  orders and vitals.    Discussed case with:  [X]RN  []CM/SW  [X]Patient  [X]Family  []Specialist:

## 2022-02-21 NOTE — PROGRESS NOTE ADULT - ASSESSMENT
MEDICATIONS  (STANDING):  ALPRAZolam 0.5 milliGRAM(s) Oral at bedtime  amLODIPine   Tablet 2.5 milliGRAM(s) Oral daily  ATENolol  Tablet 50 milliGRAM(s) Oral every 12 hours  carbidopa/levodopa  25/100 1 Tablet(s) Oral <User Schedule>  enoxaparin Injectable 40 milliGRAM(s) SubCutaneous daily  iron sucrose Injectable 200 milliGRAM(s) IV Push once  losartan 25 milliGRAM(s) Oral two times a day  pantoprazole  Injectable 40 milliGRAM(s) IV Push every 12 hours  potassium chloride    Tablet ER 40 milliEquivalent(s) Oral once    MEDICATIONS  (PRN):  acetaminophen     Tablet .. 650 milliGRAM(s) Oral every 6 hours PRN Temp greater or equal to 38C (100.4F), Mild Pain (1 - 3)  aluminum hydroxide/magnesium hydroxide/simethicone Suspension 30 milliLiter(s) Oral every 4 hours PRN Dyspepsia  hydrALAZINE 25 milliGRAM(s) Oral every 6 hours PRN SBP>170 and/or DBP>105  loperamide 2 milliGRAM(s) Oral every 6 hours PRN Diarrhea  melatonin 3 milliGRAM(s) Oral at bedtime PRN Insomnia  ondansetron Injectable 4 milliGRAM(s) IV Push every 8 hours PRN Nausea and/or Vomiting  zinc oxide 40% Ointment 1 Application(s) Topical four times a day PRN IAD      ASSESSMENT/PLAN    Sepsis Secondary to Acute Gastroenteritis with Rotavirus (POA) with associated tachycardia/tachypnea  Ileus vs Possible early SBO/Partial SBO. Improving  Reported dark hematemesis x 1 with stable Hgb. R/O Upper GIB  Associated intractable nausea/vomiting due to above  Anemia without any further overt signs of bleeding. Likely hemodilutional  -Admit to medicine  -Resolving symptoms  -GI PRC positive for Rotavirus. C diff negative. Contact isolation precautions.   -NPO except for meds initially. Advancing diet as tolerated  -PPI  -GI on board  -Pain control/bowel regimen   -Continue to monitor Hgb. Drop from 13.1 to 10.7 could be dilution. Hgb stable. No overt signs of bleeding except for dark hematemesis x 1 in ER. DC IVF and continue to monitor Hgb.   -B12/Folate WNL  -Mildly low Iron levels. Venofer x 1.   -Associated electrolyte disturbances due to above. Continue to monitor and replace electrolytes accordingly    Parkinson's   -Home meds    HTN. Uncontrolled  -Home Rx. Titrate accordingly  -PRN Rx for elevated BP    Asymptomatic E. Coli Bacteruria  -Nausea and vomiting likely from ileus/SBO. Now resolved without continued UTI intervention.  -Urine cultures with E. Coli  -No dysuria or change in urinary frequency or change in mentation  -Cipro x 1 administered in ER. No leukocytosis and afebrile. Observe off antibiotics. Still asymptomatic from urinary standpoint.     DVT Prophylaxis: Lovenox subq    Goals of Care (GOC)/Advance Care Planning (ACP)  Date of Discussion/evaluation: 2/18/2022  Code Status: Full Code  HCP: 2 Daughters: Key Ruthlli ([phone]) and Maria Fernanda Sullivan ([phone])  Alternative Feeding methods: Would like to discuss or assess should the situation arise that it requires alternative feeding methods  Blood Product Transfusions: YES agreeable  Pressors: YES agreeable

## 2022-02-22 ENCOUNTER — TRANSCRIPTION ENCOUNTER (OUTPATIENT)
Age: 87
End: 2022-02-22

## 2022-02-22 VITALS
DIASTOLIC BLOOD PRESSURE: 48 MMHG | SYSTOLIC BLOOD PRESSURE: 129 MMHG | OXYGEN SATURATION: 96 % | RESPIRATION RATE: 18 BRPM | HEART RATE: 66 BPM

## 2022-02-22 LAB
ANION GAP SERPL CALC-SCNC: 3 MMOL/L — LOW (ref 5–17)
BUN SERPL-MCNC: 14 MG/DL — SIGNIFICANT CHANGE UP (ref 7–23)
CALCIUM SERPL-MCNC: 8.3 MG/DL — LOW (ref 8.5–10.1)
CHLORIDE SERPL-SCNC: 105 MMOL/L — SIGNIFICANT CHANGE UP (ref 96–108)
CO2 SERPL-SCNC: 29 MMOL/L — SIGNIFICANT CHANGE UP (ref 22–31)
CREAT SERPL-MCNC: 0.41 MG/DL — LOW (ref 0.5–1.3)
GLUCOSE SERPL-MCNC: 97 MG/DL — SIGNIFICANT CHANGE UP (ref 70–99)
HCT VFR BLD CALC: 35.7 % — SIGNIFICANT CHANGE UP (ref 34.5–45)
HGB BLD-MCNC: 11.9 G/DL — SIGNIFICANT CHANGE UP (ref 11.5–15.5)
MAGNESIUM SERPL-MCNC: 1.9 MG/DL — SIGNIFICANT CHANGE UP (ref 1.6–2.6)
POTASSIUM SERPL-MCNC: 3.7 MMOL/L — SIGNIFICANT CHANGE UP (ref 3.5–5.3)
POTASSIUM SERPL-SCNC: 3.7 MMOL/L — SIGNIFICANT CHANGE UP (ref 3.5–5.3)
SODIUM SERPL-SCNC: 137 MMOL/L — SIGNIFICANT CHANGE UP (ref 135–145)

## 2022-02-22 PROCEDURE — 99239 HOSP IP/OBS DSCHRG MGMT >30: CPT

## 2022-02-22 RX ORDER — LOPERAMIDE HCL 2 MG
1 TABLET ORAL
Qty: 0 | Refills: 0 | DISCHARGE
Start: 2022-02-22

## 2022-02-22 RX ORDER — LOSARTAN POTASSIUM 100 MG/1
1 TABLET, FILM COATED ORAL
Qty: 0 | Refills: 0 | DISCHARGE

## 2022-02-22 RX ORDER — AMLODIPINE BESYLATE 2.5 MG/1
1 TABLET ORAL
Qty: 0 | Refills: 0 | DISCHARGE
Start: 2022-02-22

## 2022-02-22 RX ORDER — LOSARTAN POTASSIUM 100 MG/1
1 TABLET, FILM COATED ORAL
Qty: 0 | Refills: 0 | DISCHARGE
Start: 2022-02-22

## 2022-02-22 RX ORDER — ZINC OXIDE 200 MG/G
1 OINTMENT TOPICAL
Qty: 0 | Refills: 0 | DISCHARGE
Start: 2022-02-22

## 2022-02-22 RX ORDER — ATENOLOL 25 MG/1
1 TABLET ORAL
Qty: 0 | Refills: 0 | DISCHARGE
Start: 2022-02-22

## 2022-02-22 RX ORDER — ATENOLOL 25 MG/1
1 TABLET ORAL
Qty: 0 | Refills: 0 | DISCHARGE

## 2022-02-22 RX ORDER — FERROUS SULFATE 325(65) MG
1 TABLET ORAL
Qty: 0 | Refills: 0 | DISCHARGE

## 2022-02-22 RX ORDER — FAMOTIDINE 10 MG/ML
1 INJECTION INTRAVENOUS
Qty: 0 | Refills: 0 | DISCHARGE

## 2022-02-22 RX ORDER — AMLODIPINE BESYLATE 2.5 MG/1
1 TABLET ORAL
Qty: 0 | Refills: 0 | DISCHARGE

## 2022-02-22 RX ADMIN — ZINC OXIDE 1 APPLICATION(S): 200 OINTMENT TOPICAL at 09:19

## 2022-02-22 RX ADMIN — CARBIDOPA AND LEVODOPA 1 TABLET(S): 25; 100 TABLET ORAL at 09:17

## 2022-02-22 RX ADMIN — LOSARTAN POTASSIUM 25 MILLIGRAM(S): 100 TABLET, FILM COATED ORAL at 09:16

## 2022-02-22 RX ADMIN — ATENOLOL 50 MILLIGRAM(S): 25 TABLET ORAL at 09:17

## 2022-02-22 RX ADMIN — ENOXAPARIN SODIUM 40 MILLIGRAM(S): 100 INJECTION SUBCUTANEOUS at 09:17

## 2022-02-22 RX ADMIN — AMLODIPINE BESYLATE 2.5 MILLIGRAM(S): 2.5 TABLET ORAL at 09:17

## 2022-02-22 RX ADMIN — PANTOPRAZOLE SODIUM 40 MILLIGRAM(S): 20 TABLET, DELAYED RELEASE ORAL at 09:18

## 2022-02-22 NOTE — DISCHARGE NOTE PROVIDER - HOSPITAL COURSE
FROM H&P:    "87 y/o F with PMHx of CAD on Plavix, anemia, Parkinson's, HTN, anxiety, presents to ED from [facility] because of intractable nausea and vomiting and intermittent abdominal pain x 1-2 days. Initially bilious non-bloody. Administered anti-emetics in the ER with some improvement but then administered bactrim and given something to eat prior to ER discharge adn reported dark coffee ground emesis x 1 and unable to tolerate PO. Since then patient does report improved nausea and BM x 2 since arrival to ER. Some intermittent 5-8/10 cramping pain with some interval improvement.    In the ER, Afebrile and hemodynamically stable (some elevated BP), CBC, CMP. Lactate, Troponin WNL. CT Abdomen with findings of SBO/Ileus (mild) without transition point. UA suggestive of UTI (Pencillin allergy with unknown reaction reported) "    Sepsis Secondary to Acute Gastroenteritis with Rotavirus (POA) with associated tachycardia/tachypnea  Ileus vs Possible early SBO/Partial SBO. Improving  Reported dark hematemesis x 1 with stable Hgb. R/O Upper GIB  Associated intractable nausea/vomiting due to above  Anemia without any further overt signs of bleeding. Likely hemodilutional. Unlikely acute GIB  -Admit to medicine  -Resolving symptoms  -GI PRC positive for Rotavirus. C diff negative. Contact isolation precautions.   -NPO except for meds initially. Advancing diet as tolerated  -PPI  -GI on board. Low suspicion for GIB. Outpatient follow up.   -Pain control/bowel regimen   -Continue to monitor Hgb. Drop from 13.1 to 10.7 could be dilution. Hgb stable. No overt signs of bleeding except for dark hematemesis x 1 in ER. DC IVF and continue to monitor Hgb.   -B12/Folate WNL  -Mildly low Iron levels. Venofer x 1.   -Associated electrolyte disturbances due to above. Continue to monitor and replace electrolytes accordingly    Parkinson's   -Home meds    HTN. Uncontrolled  -Home Rx. Titrate accordingly  -PRN Rx for elevated BP    Asymptomatic E. Coli Bacteruria  -Nausea and vomiting likely from ileus/SBO. Now resolved without continued UTI intervention.  -Urine cultures with E. Coli  -No dysuria or change in urinary frequency or change in mentation  -Cipro x 1 administered in ER. No leukocytosis and afebrile. Observe off antibiotics. Still asymptomatic from urinary standpoint.     BM normalizing. No nausae, vomiting or abdominal pain. hgb stable. No overt signs of bleeding. Tolerating diet. Discharge to [facility] in stable condition and close outpatient follow up.   T(C): 36.8 (02-22-22 @ 08:20), Max: 36.8 (02-21-22 @ 21:23)  HR: 67 (02-22-22 @ 08:20) (67 - 79)  BP: 163/50 (02-22-22 @ 08:20) (154/80 - 175/62)  RR: 17 (02-22-22 @ 08:20) (17 - 18)  SpO2: 95% (02-22-22 @ 08:20) (93% - 95%)  General: AAOx3; NAD  Head: AT/NC  ENT: Moist Mucous Membranes; No Injury  Eyes: EOMI; PERRL; Baseline Left eye lid lag  Neck: Non-tender; No JVD  CVS: RRR, S1&S2, No murmur,   Respiratory: Lungs CTA B/L; Normal Respiratory Effort  Abdomen/GI: Soft, non-tender, non-distended, no guarding, no rebound, normal bowel sounds  : No bladder distention  Extremities: No cyanosis, No clubbing,  MSK: No CVA tenderness, Normal ROM, No injury  Neuro: AAOx3, CNII-XII grossly intact (except baseline left eye lid lag), non-focal  Psych: Appropriate, Cooperative, Flat affect  Skin: Clean, Dry and Intact  Discharge Management: 37 minutes  Date of Discharge/Service: 2/22/2022

## 2022-02-22 NOTE — DISCHARGE NOTE PROVIDER - NSDCCPCAREPLAN_GEN_ALL_CORE_FT
PRINCIPAL DISCHARGE DIAGNOSIS  Diagnosis: Rotavirus infection  Assessment and Plan of Treatment: Associated nausea, vomiting and abdominal pain resolved  Initially with constipation->then to diarrhea->resolved  C Diff test negative.        SECONDARY DISCHARGE DIAGNOSES  Diagnosis: Asymptomatic bacteriuria  Assessment and Plan of Treatment: E. Coli    Diagnosis: Sepsis  Assessment and Plan of Treatment: POA due to Rotavirus uninfection. Resolved

## 2022-02-22 NOTE — DISCHARGE NOTE PROVIDER - NSDCMRMEDTOKEN_GEN_ALL_CORE_FT
ALPRAZolam 0.5 mg oral tablet: 1 tab(s) orally once a day (in the evening)  amLODIPine 2.5 mg oral tablet: 1 tab(s) orally once a day  atenolol 50 mg oral tablet: 1 tab(s) orally every 12 hours  carbidopa-levodopa 25 mg-100 mg oral tablet: 2 tab(s) orally 2 times a day  [employer] Senior oral tablet: 1 tab(s) orally once a day  clopidogrel 75 mg oral tablet: 1 tab(s) orally 3 times a week on Mon, Wed, Fri  ezetimibe 10 mg oral tablet: 1 tab(s) orally once a day  loperamide 2 mg oral capsule: 1 cap(s) orally every 6 hours, As needed, Diarrhea  losartan 25 mg oral tablet: 1 tab(s) orally 2 times a day  Protonix 40 mg oral delayed release tablet: 1 tab(s) orally once a day  Vitamin D3 125 mcg (5000 intl units) oral tablet: 1 tab(s) orally once a day  zinc oxide 40% topical ointment: 1 application topically 4 times a day, As needed, IAD

## 2022-02-22 NOTE — DISCHARGE NOTE PROVIDER - CARE PROVIDER_API CALL
Lyndon Garza (DO)  Medicine  [address], [address]  [place] [address]  Phone: [phone]  Fax: [phone]  Established Patient  Follow Up Time: 1 week    Santi Francis  GASTROENTEROLOGY  [address], [address]  [place] [address]  Phone: [phone]  Fax: [phone]  Established Patient  Follow Up Time: 2 weeks

## 2022-02-22 NOTE — DISCHARGE NOTE NURSING/CASE MANAGEMENT/SOCIAL WORK - NSDCPEFALRISK_GEN_ALL_CORE
For information on Fall & Injury Prevention, visit: [email or web address] OR  [email or web address] OR  https://www.cdc.gov/steadi/patient.html

## 2022-02-22 NOTE — DISCHARGE NOTE NURSING/CASE MANAGEMENT/SOCIAL WORK - PATIENT PORTAL LINK FT
You can access the FollowMyHealth Patient Portal offered by [facility] by registering at the following website: [email or web address]. By joining [employer]’s FollowMyHealth portal, you will also be able to view your health information using other applications (apps) compatible with our system. Negative

## 2022-02-22 NOTE — DISCHARGE NOTE PROVIDER - NSDCFUADDINST_GEN_ALL_CORE_FT
Patient with intermittent constipation from Parkinson's and Oral Iron Supplementation. Iron Supplementation discontinued at discharge. Repeat Iron Panel/Ferritin in 2-4 weeks. Diarrhea on this admission from rotavirus resolving. May have recurrence of constipation. Use/Restart Stool Softener and/or Laxative if no bowel movement in 24-48 hours. Titrate to have one bowel movement daily.

## 2022-02-23 LAB
CULTURE RESULTS: SIGNIFICANT CHANGE UP
SPECIMEN SOURCE: SIGNIFICANT CHANGE UP

## 2022-02-28 DIAGNOSIS — K56.609 UNSPECIFIED INTESTINAL OBSTRUCTION, UNSPECIFIED AS TO PARTIAL VERSUS COMPLETE OBSTRUCTION: ICD-10-CM

## 2022-02-28 DIAGNOSIS — A08.0 ROTAVIRAL ENTERITIS: ICD-10-CM

## 2022-02-28 DIAGNOSIS — K92.0 HEMATEMESIS: ICD-10-CM

## 2022-02-28 DIAGNOSIS — D64.9 ANEMIA, UNSPECIFIED: ICD-10-CM

## 2022-02-28 DIAGNOSIS — A41.9 SEPSIS, UNSPECIFIED ORGANISM: ICD-10-CM

## 2022-02-28 DIAGNOSIS — F41.9 ANXIETY DISORDER, UNSPECIFIED: ICD-10-CM

## 2022-02-28 DIAGNOSIS — Z79.02 LONG TERM (CURRENT) USE OF ANTITHROMBOTICS/ANTIPLATELETS: ICD-10-CM

## 2022-02-28 DIAGNOSIS — G20 PARKINSON'S DISEASE: ICD-10-CM

## 2022-02-28 DIAGNOSIS — B96.21 SHIGA TOXIN-PRODUCING ESCHERICHIA COLI [E. COLI] [STEC] O157 AS THE CAUSE OF DISEASES CLASSIFIED ELSEWHERE: ICD-10-CM

## 2022-02-28 DIAGNOSIS — I10 ESSENTIAL (PRIMARY) HYPERTENSION: ICD-10-CM

## 2022-02-28 DIAGNOSIS — I25.10 ATHEROSCLEROTIC HEART DISEASE OF NATIVE CORONARY ARTERY WITHOUT ANGINA PECTORIS: ICD-10-CM

## 2022-11-30 NOTE — PATIENT PROFILE ADULT - BRAND OF COVID-19 VACCINATION
Pfizer dose 1, 2, and 3 Modify Regimen: tretinoin 0.1 % topical cream As directed\\nQuantity: 45.0 g  Days Supply: 30\\nSig: Apply a pea sized amount to entire face qohs x 2 weeks, then increase to qhs as tolerated. Avoid the eye area\\n\\n\\ndoxycycline hyclate 100 mg capsule QD\\nQuantity: 60.0 Capsule  Days Supply: 30\\nSig: Take capsule po daily X 2 months. Do not lay down for an hour after taking it.\\n\\nspironolactone 50 mg tablet As directed\\nQuantity: 60.0 Tablet  Days Supply: 90\\nSig: Take one tab by mouth once daily x 2 weeks then increase to one tab twice daily Detail Level: Zone Render In Strict Bullet Format?: No Continue Regimen: clindamycin 1 %-benzoyl peroxide 5 % topical gel with pump Qam\\nQuantity: 50.0 g  Days Supply: 30\\nSig: Apply to face every morning after washing\\n\\nYAZ (28) 3 mg-0.02 mg tablet As directed\\nQuantity: 28.0 Tablet  Days Supply: 30\\nSig: Take 1 tab as directed

## 2023-01-12 NOTE — PATIENT PROFILE ADULT - DATE OF FIRST COVID-19 BOOSTER
[Normal] : no joint swelling, no clubbing or cyanosis of the fingernails and muscle strength and tone were normal [de-identified] : s/p left partial mastectomy 23-Nov-2022

## 2023-11-09 NOTE — ED PROVIDER NOTE - NEUROLOGICAL, MLM
[x] [facility]  Outpatient Rehabilitation &  Therapy  [id number] Jarod: [phone]  F: [phone]             Physical Therapy Daily Treatment Note    Date:  2023  Patient Name:  Nitza Helton    :    MRN: [id number]  Physician: Robbin Cook MD       Insurance: MEDICARE  Medical Diagnosis: Bilateral primary osteoarthritis of knee [M17.0]  Rehab Codes: M25.562, M25.561,   Onset Date: 6/10/2021    Next  [address] [place] [address] [place]: unknown  Visit# / total visits: ; Progress note for Medicare patient due at visit 10     Cancels/No Shows: 0/0    Subjective:  Pt states knee pain is minimal today, but R knee feels a little stiff. He is going to play Pickleball today. Pain:  [x] Yes  [] No Location: medial R knee Pain Rating: (0-10 scale) 1/10  Pain altered Tx:  [x] No  [] Yes  Action:  Comments: Medial R knee joint line is subjectively tender/painful to palpation today. Objective: Gait is not antalgic today. Modalities:   Precautions: none  Exercises: NP = Not Performed  Exercise Reps/ Time Weight/ Level Comments   Airdyne  5 mins  Seat 9   Leg press - bilat                   - unilat 2 x 15   2 x 10 reps  L13  L6.5   L&R   Calf stretches on stairmaster 5 reps 15 secs each bilat   TKE with tubing 15 reps Dbl blue L&R    BATCA hamstring curls   2 x 10 reps 35#    BATCA knee extension 2 x 10 reps 35#    Manual hamstring stretches  5 x 15 secs  L&R         Lateral stepovers  10 reps  8\" box                Manual stretches for knee extension 5 mins     Manual stretches of hip flexors/quads 5 x 15 secs  L&R                                              Other:  Access Code: [email or web address]  URL: [email or web address]  Date: 10/30/2023  Prepared by: Carlton Willard     Exercises  - Supine Bridge  - 1-2 x daily - 5 x weekly - 2 sets - 12 reps - 2 secs hold  - Sidelying Hip Abduction  - 1-2 x daily - 5 x weekly - 1 sets - 12 reps - 2 secs hold  - Sidelying Hip Abduction (Mirrored)  - 1-2 x daily - 5 x
Alert and oriented, no focal deficits, no motor or sensory deficits.

## 2024-04-19 ENCOUNTER — EMERGENCY (EMERGENCY)
Facility: HOSPITAL | Age: 89
LOS: 0 days | Discharge: HOME | End: 2024-04-20
Attending: EMERGENCY MEDICINE
Payer: MEDICARE

## 2024-04-19 VITALS
TEMPERATURE: 98 F | OXYGEN SATURATION: 97 % | SYSTOLIC BLOOD PRESSURE: 168 MMHG | HEART RATE: 74 BPM | DIASTOLIC BLOOD PRESSURE: 69 MMHG | RESPIRATION RATE: 18 BRPM

## 2024-04-19 DIAGNOSIS — I25.10 ATHEROSCLEROTIC HEART DISEASE OF NATIVE CORONARY ARTERY WITHOUT ANGINA PECTORIS: ICD-10-CM
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
## 2024-04-19 DIAGNOSIS — Z86.2 PERSONAL HISTORY OF DISEASES OF THE BLOOD AND BLOOD-FORMING ORGANS AND CERTAIN DISORDERS INVOLVING THE IMMUNE MECHANISM: ICD-10-CM

## 2024-04-19 DIAGNOSIS — K21.9 GASTRO-ESOPHAGEAL REFLUX DISEASE WITHOUT ESOPHAGITIS: ICD-10-CM

## 2024-04-19 DIAGNOSIS — F02.80 DEMENTIA IN OTHER DISEASES CLASSIFIED ELSEWHERE, UNSPECIFIED SEVERITY, WITHOUT BEHAVIORAL DISTURBANCE, PSYCHOTIC DISTURBANCE, MOOD DISTURBANCE, AND ANXIETY: ICD-10-CM

## 2024-04-19 DIAGNOSIS — G20.A1 PARKINSON'S DISEASE WITHOUT DYSKINESIA, WITHOUT MENTION OF FLUCTUATIONS: ICD-10-CM

## 2024-04-19 DIAGNOSIS — S09.90XA UNSPECIFIED INJURY OF HEAD, INITIAL ENCOUNTER: ICD-10-CM

## 2024-04-19 DIAGNOSIS — I10 ESSENTIAL (PRIMARY) HYPERTENSION: ICD-10-CM

## 2024-04-19 LAB
ALBUMIN SERPL ELPH-MCNC: 2.9 G/DL — LOW (ref 3.3–5)
ALP SERPL-CCNC: 83 U/L — SIGNIFICANT CHANGE UP (ref 40–120)
ALT FLD-CCNC: 9 U/L — LOW (ref 12–78)
ANION GAP SERPL CALC-SCNC: 3 MMOL/L — LOW (ref 5–17)
APPEARANCE UR: CLEAR — SIGNIFICANT CHANGE UP
AST SERPL-CCNC: 14 U/L — LOW (ref 15–37)
BASOPHILS # BLD AUTO: 0.1 K/UL — SIGNIFICANT CHANGE UP (ref 0–0.2)
BASOPHILS NFR BLD AUTO: 1.1 % — SIGNIFICANT CHANGE UP (ref 0–2)
BILIRUB SERPL-MCNC: 0.2 MG/DL — SIGNIFICANT CHANGE UP (ref 0.2–1.2)
BILIRUB UR-MCNC: NEGATIVE — SIGNIFICANT CHANGE UP
BUN SERPL-MCNC: 17 MG/DL — SIGNIFICANT CHANGE UP (ref 7–23)
CALCIUM SERPL-MCNC: 9.4 MG/DL — SIGNIFICANT CHANGE UP (ref 8.5–10.1)
CHLORIDE SERPL-SCNC: 105 MMOL/L — SIGNIFICANT CHANGE UP (ref 96–108)
CO2 SERPL-SCNC: 31 MMOL/L — SIGNIFICANT CHANGE UP (ref 22–31)
COLOR SPEC: YELLOW — SIGNIFICANT CHANGE UP
CREAT SERPL-MCNC: 0.74 MG/DL — SIGNIFICANT CHANGE UP (ref 0.5–1.3)
DIFF PNL FLD: NEGATIVE — SIGNIFICANT CHANGE UP
EGFR: 78 ML/MIN/1.73M2 — SIGNIFICANT CHANGE UP
EOSINOPHIL # BLD AUTO: 0.14 K/UL — SIGNIFICANT CHANGE UP (ref 0–0.5)
EOSINOPHIL NFR BLD AUTO: 1.5 % — SIGNIFICANT CHANGE UP (ref 0–6)
GLUCOSE SERPL-MCNC: 118 MG/DL — HIGH (ref 70–99)
GLUCOSE UR QL: NEGATIVE MG/DL — SIGNIFICANT CHANGE UP
HCT VFR BLD CALC: 39.9 % — SIGNIFICANT CHANGE UP (ref 34.5–45)
HGB BLD-MCNC: 12.9 G/DL — SIGNIFICANT CHANGE UP (ref 11.5–15.5)
IMM GRANULOCYTES NFR BLD AUTO: 0.8 % — SIGNIFICANT CHANGE UP (ref 0–0.9)
KETONES UR-MCNC: NEGATIVE MG/DL — SIGNIFICANT CHANGE UP
LEUKOCYTE ESTERASE UR-ACNC: NEGATIVE — SIGNIFICANT CHANGE UP
LYMPHOCYTES # BLD AUTO: 1.83 K/UL — SIGNIFICANT CHANGE UP (ref 1–3.3)
LYMPHOCYTES # BLD AUTO: 19.6 % — SIGNIFICANT CHANGE UP (ref 13–44)
MCHC RBC-ENTMCNC: 30.6 PG — SIGNIFICANT CHANGE UP (ref 27–34)
MCHC RBC-ENTMCNC: 32.3 GM/DL — SIGNIFICANT CHANGE UP (ref 32–36)
MCV RBC AUTO: 94.5 FL — SIGNIFICANT CHANGE UP (ref 80–100)
MONOCYTES # BLD AUTO: 0.89 K/UL — SIGNIFICANT CHANGE UP (ref 0–0.9)
MONOCYTES NFR BLD AUTO: 9.5 % — SIGNIFICANT CHANGE UP (ref 2–14)
NEUTROPHILS # BLD AUTO: 6.3 K/UL — SIGNIFICANT CHANGE UP (ref 1.8–7.4)
NEUTROPHILS NFR BLD AUTO: 67.5 % — SIGNIFICANT CHANGE UP (ref 43–77)
NITRITE UR-MCNC: NEGATIVE — SIGNIFICANT CHANGE UP
PH UR: 6 — SIGNIFICANT CHANGE UP (ref 5–8)
PLATELET # BLD AUTO: 304 K/UL — SIGNIFICANT CHANGE UP (ref 150–400)
POTASSIUM SERPL-MCNC: 4.3 MMOL/L — SIGNIFICANT CHANGE UP (ref 3.5–5.3)
POTASSIUM SERPL-SCNC: 4.3 MMOL/L — SIGNIFICANT CHANGE UP (ref 3.5–5.3)
PROT SERPL-MCNC: 6.7 GM/DL — SIGNIFICANT CHANGE UP (ref 6–8.3)
PROT UR-MCNC: NEGATIVE MG/DL — SIGNIFICANT CHANGE UP
RBC # BLD: 4.22 M/UL — SIGNIFICANT CHANGE UP (ref 3.8–5.2)
RBC # FLD: 13.2 % — SIGNIFICANT CHANGE UP (ref 10.3–14.5)
SODIUM SERPL-SCNC: 139 MMOL/L — SIGNIFICANT CHANGE UP (ref 135–145)
SP GR SPEC: 1.01 — SIGNIFICANT CHANGE UP (ref 1–1.03)
TROPONIN I, HIGH SENSITIVITY RESULT: 8.57 NG/L — SIGNIFICANT CHANGE UP
UROBILINOGEN FLD QL: 0.2 MG/DL — SIGNIFICANT CHANGE UP (ref 0.2–1)
WBC # BLD: 9.33 K/UL — SIGNIFICANT CHANGE UP (ref 3.8–10.5)
WBC # FLD AUTO: 9.33 K/UL — SIGNIFICANT CHANGE UP (ref 3.8–10.5)

## 2024-04-19 PROCEDURE — 80053 COMPREHEN METABOLIC PANEL: CPT

## 2024-04-19 PROCEDURE — 87086 URINE CULTURE/COLONY COUNT: CPT

## 2024-04-19 PROCEDURE — 93005 ELECTROCARDIOGRAM TRACING: CPT

## 2024-04-19 PROCEDURE — 99285 EMERGENCY DEPT VISIT HI MDM: CPT | Mod: 25

## 2024-04-19 PROCEDURE — 71045 X-RAY EXAM CHEST 1 VIEW: CPT

## 2024-04-19 PROCEDURE — 84484 ASSAY OF TROPONIN QUANT: CPT

## 2024-04-19 PROCEDURE — 83605 ASSAY OF LACTIC ACID: CPT | Mod: 91

## 2024-04-19 PROCEDURE — 36415 COLL VENOUS BLD VENIPUNCTURE: CPT

## 2024-04-19 PROCEDURE — 85025 COMPLETE CBC W/AUTO DIFF WBC: CPT

## 2024-04-19 PROCEDURE — 81003 URINALYSIS AUTO W/O SCOPE: CPT

## 2024-04-19 PROCEDURE — 71045 X-RAY EXAM CHEST 1 VIEW: CPT | Mod: 26

## 2024-04-19 PROCEDURE — 99285 EMERGENCY DEPT VISIT HI MDM: CPT

## 2024-04-19 PROCEDURE — 70450 CT HEAD/BRAIN W/O DYE: CPT | Mod: MC

## 2024-04-19 PROCEDURE — 70450 CT HEAD/BRAIN W/O DYE: CPT | Mod: 26,MC

## 2024-04-19 NOTE — ED ADULT TRIAGE NOTE - CHIEF COMPLAINT QUOTE
pt presents to the ED [facility] [place] [facility] [place]. pt states that she was looking for the call bell to call for help and then she fell. hit back of head. c/o pain to head. - anticoagulation. neuro alert called at triage by Dr. Jain and pt taken to CT

## 2024-04-19 NOTE — ED PROVIDER NOTE - PROGRESS NOTE DETAILS
When sedation was weaned patient was becoming agitated with the tube. Patient had good cuff leak and was breathing over the vent with good tidal volumes. Patient was responding by nodding and stated that agreed to have the tube removed. Patient was subsequently extubated and was satting 97% on room air without stridor or difficulty breathing. Nasal cannula placed for comfort at this time. Patient was speaking without difficulty. Josephine Mauricio MD, Attending  Pt received at signout pending UA, lactate, repeat trop.   UA not suggestive of infection.   lactate improving with IVF, troponin 2x negative.   Ambulate ride home.

## 2024-04-19 NOTE — ED ADULT NURSE NOTE - NSFALLHARMRISKINTERV_ED_ALL_ED

## 2024-04-19 NOTE — ED PROVIDER NOTE - NSFOLLOWUPINSTRUCTIONS_ED_ALL_ED_FT
** Follow up with your primary care doctor in the next 72 hours.     ** Go to the nearest Emergency Department if you experience any new or concerning symptoms, such as:   - worsening pain  - chest pain  - difficulty breathing  - passing out  - unable to eat or drink  - unable to move or feel part of your body  - fever, chills

## 2024-04-19 NOTE — ED PROVIDER NOTE - PATIENT PORTAL LINK FT
You can access the FollowMyHealth Patient Portal offered by [facility] by registering at the following website: [email or web address]. By joining [employer]’s FollowMyHealth portal, you will also be able to view your health information using other applications (apps) compatible with our system.

## 2024-04-19 NOTE — ED PROVIDER NOTE - OBJECTIVE STATEMENT
89 y/o female with PMHx of anemia, Parkinson's dementia, CAD, GERD, HTN; presents to ED from [facility] s/p unwitnessed fall, occurred after patient was looking for call bell for assistance, +head-strike to back of head. Patient poor historian due to Parkinson's dementia, does not remember the reason why she needed assistance at the time or where she was during the fall. Son at bedside states patient at baseline mental status, also notes recent UTI currently being treated with Cipro started on 4/13. No AC. Denies any pain, has no other complaints at this time. +MOLST DNR/DNI NO IV fluids or feeding tub

## 2024-04-19 NOTE — ED ADULT NURSE NOTE - OBJECTIVE STATEMENT
Pt presents to the ED from [facility] s/p fall.     pt presents to the ED [facility] [place] [facility]. pt states that she was looking for the call bell to call for help and then she fell. hit back of head. c/o pain to head. - anticoagulation. neuro alert called at triage by Dr. Jain and pt taken to CT Pt presents to the ED from [place] s/p fall. Pt states she was looking for the call bell but couldn't remember why. Pts son at bedside states she is usually looking for the call bell when she has to use the bathroom. Pts son also states this is pts baseline. Pt denies any pain at this time. -anticoagulation. NA called in triage and pt brought to CT.

## 2024-04-19 NOTE — ED PROVIDER NOTE - PHYSICAL EXAMINATION
Constitutional: NAD, confused, AOx1  Eyes: PERRL EOMI  Head: Normocephalic atraumatic  Mouth: MMM  Cardiac: regular rate and rhythm  Resp: Lungs CTAB  GI: Abd s/nd/nt  Back: C-spine non-tender to palpation  Neuro: CN2-12 grossly intact, SOTELO x 4  Skin: No visible rashes

## 2024-04-20 VITALS
HEART RATE: 63 BPM | RESPIRATION RATE: 18 BRPM | DIASTOLIC BLOOD PRESSURE: 64 MMHG | OXYGEN SATURATION: 95 % | SYSTOLIC BLOOD PRESSURE: 168 MMHG

## 2024-04-20 LAB
LACTATE SERPL-SCNC: 1.9 MMOL/L — SIGNIFICANT CHANGE UP (ref 0.7–2)
LACTATE SERPL-SCNC: 2.8 MMOL/L — HIGH (ref 0.7–2)
TROPONIN I, HIGH SENSITIVITY RESULT: 10.29 NG/L — SIGNIFICANT CHANGE UP

## 2024-04-20 PROCEDURE — 93010 ELECTROCARDIOGRAM REPORT: CPT

## 2024-04-20 RX ORDER — LOSARTAN POTASSIUM 100 MG/1
25 TABLET, FILM COATED ORAL DAILY
Refills: 0 | Status: DISCONTINUED | OUTPATIENT
Start: 2024-04-20 | End: 2024-04-20

## 2024-04-20 RX ORDER — CARBIDOPA AND LEVODOPA 25; 100 MG/1; MG/1
2 TABLET ORAL ONCE
Refills: 0 | Status: COMPLETED | OUTPATIENT
Start: 2024-04-20 | End: 2024-04-20

## 2024-04-20 RX ORDER — AMLODIPINE BESYLATE 2.5 MG/1
2.5 TABLET ORAL ONCE
Refills: 0 | Status: COMPLETED | OUTPATIENT
Start: 2024-04-20 | End: 2024-04-20

## 2024-04-20 RX ORDER — SODIUM CHLORIDE 9 MG/ML
500 INJECTION INTRAMUSCULAR; INTRAVENOUS; SUBCUTANEOUS ONCE
Refills: 0 | Status: COMPLETED | OUTPATIENT
Start: 2024-04-20 | End: 2024-04-20

## 2024-04-20 RX ORDER — ATENOLOL 25 MG/1
50 TABLET ORAL ONCE
Refills: 0 | Status: COMPLETED | OUTPATIENT
Start: 2024-04-20 | End: 2024-04-20

## 2024-04-20 RX ADMIN — CARBIDOPA AND LEVODOPA 2 TABLET(S): 25; 100 TABLET ORAL at 06:59

## 2024-04-20 RX ADMIN — SODIUM CHLORIDE 1000 MILLILITER(S): 9 INJECTION INTRAMUSCULAR; INTRAVENOUS; SUBCUTANEOUS at 02:08

## 2024-04-20 RX ADMIN — ATENOLOL 50 MILLIGRAM(S): 25 TABLET ORAL at 06:59

## 2024-04-20 RX ADMIN — LOSARTAN POTASSIUM 25 MILLIGRAM(S): 100 TABLET, FILM COATED ORAL at 07:00

## 2024-04-20 RX ADMIN — AMLODIPINE BESYLATE 2.5 MILLIGRAM(S): 2.5 TABLET ORAL at 07:00

## 2024-04-20 NOTE — ED ADULT NURSE REASSESSMENT NOTE - NS ED NURSE REASSESS COMMENT FT1
Assumed care of pt from Des[place]od RN, pt observed safe with stretcher in lowest position, awaiting ambulance for d/c.
Pt laying in stretcher in [place]. No acute distress noted at this time. Pts son leaving and gave phone number to contact him. Siddhartha Ledezma [phone].
Received handoff from ELIUD Martinez. Pt sitting in stretcher in room with family at bedside. No acute distress noted at this time. Comfort and safety measures maintained.
pt received from previous RN. pt contact made. pt resting comfortably in stretcher in view from nursing station. pt appears comfortable; has no complaints at this time. pending repeat lactate.

## 2024-04-21 LAB
CULTURE RESULTS: NO GROWTH — SIGNIFICANT CHANGE UP
SPECIMEN SOURCE: SIGNIFICANT CHANGE UP

## 2024-05-12 ENCOUNTER — INPATIENT (INPATIENT)
Facility: HOSPITAL | Age: 89
LOS: 2 days | Discharge: NURSING, REHAB OR LONG-TERM CARE | DRG: 690 | End: 2024-05-15
Attending: STUDENT IN AN ORGANIZED HEALTH CARE EDUCATION/TRAINING PROGRAM | Admitting: STUDENT IN AN ORGANIZED HEALTH CARE EDUCATION/TRAINING PROGRAM
Payer: MEDICARE

## 2024-05-12 VITALS
RESPIRATION RATE: 16 BRPM | HEART RATE: 76 BPM | HEIGHT: 66 IN | TEMPERATURE: 99 F | SYSTOLIC BLOOD PRESSURE: 186 MMHG | WEIGHT: 186.07 LBS | OXYGEN SATURATION: 94 % | DIASTOLIC BLOOD PRESSURE: 79 MMHG

## 2024-05-12 DIAGNOSIS — N30.00 ACUTE CYSTITIS WITHOUT HEMATURIA: ICD-10-CM

## 2024-05-12 LAB
ALBUMIN SERPL ELPH-MCNC: 3 G/DL — LOW (ref 3.3–5)
ALP SERPL-CCNC: 90 U/L — SIGNIFICANT CHANGE UP (ref 40–120)
ALT FLD-CCNC: 14 U/L — SIGNIFICANT CHANGE UP (ref 12–78)
ANION GAP SERPL CALC-SCNC: 4 MMOL/L — LOW (ref 5–17)
APPEARANCE UR: CLEAR — SIGNIFICANT CHANGE UP
AST SERPL-CCNC: 18 U/L — SIGNIFICANT CHANGE UP (ref 15–37)
BILIRUB SERPL-MCNC: 0.3 MG/DL — SIGNIFICANT CHANGE UP (ref 0.2–1.2)
BILIRUB UR-MCNC: NEGATIVE — SIGNIFICANT CHANGE UP
BUN SERPL-MCNC: 15 MG/DL — SIGNIFICANT CHANGE UP (ref 7–23)
CALCIUM SERPL-MCNC: 9.1 MG/DL — SIGNIFICANT CHANGE UP (ref 8.5–10.1)
CHLORIDE SERPL-SCNC: 108 MMOL/L — SIGNIFICANT CHANGE UP (ref 96–108)
CO2 SERPL-SCNC: 28 MMOL/L — SIGNIFICANT CHANGE UP (ref 22–31)
COLOR SPEC: YELLOW — SIGNIFICANT CHANGE UP
CREAT SERPL-MCNC: 0.64 MG/DL — SIGNIFICANT CHANGE UP (ref 0.5–1.3)
DIFF PNL FLD: NEGATIVE — SIGNIFICANT CHANGE UP
EGFR: 85 ML/MIN/1.73M2 — SIGNIFICANT CHANGE UP
GLUCOSE SERPL-MCNC: 110 MG/DL — HIGH (ref 70–99)
GLUCOSE UR QL: NEGATIVE MG/DL — SIGNIFICANT CHANGE UP
HCT VFR BLD CALC: 39.7 % — SIGNIFICANT CHANGE UP (ref 34.5–45)
HGB BLD-MCNC: 13.1 G/DL — SIGNIFICANT CHANGE UP (ref 11.5–15.5)
KETONES UR-MCNC: NEGATIVE MG/DL — SIGNIFICANT CHANGE UP
LEUKOCYTE ESTERASE UR-ACNC: ABNORMAL
MCHC RBC-ENTMCNC: 30.8 PG — SIGNIFICANT CHANGE UP (ref 27–34)
MCHC RBC-ENTMCNC: 33 GM/DL — SIGNIFICANT CHANGE UP (ref 32–36)
MCV RBC AUTO: 93.2 FL — SIGNIFICANT CHANGE UP (ref 80–100)
NITRITE UR-MCNC: POSITIVE
PH UR: 7 — SIGNIFICANT CHANGE UP (ref 5–8)
PLATELET # BLD AUTO: 243 K/UL — SIGNIFICANT CHANGE UP (ref 150–400)
POTASSIUM SERPL-MCNC: 4 MMOL/L — SIGNIFICANT CHANGE UP (ref 3.5–5.3)
POTASSIUM SERPL-SCNC: 4 MMOL/L — SIGNIFICANT CHANGE UP (ref 3.5–5.3)
PROT SERPL-MCNC: 6.4 GM/DL — SIGNIFICANT CHANGE UP (ref 6–8.3)
PROT UR-MCNC: NEGATIVE MG/DL — SIGNIFICANT CHANGE UP
RBC # BLD: 4.26 M/UL — SIGNIFICANT CHANGE UP (ref 3.8–5.2)
RBC # FLD: 13.6 % — SIGNIFICANT CHANGE UP (ref 10.3–14.5)
SODIUM SERPL-SCNC: 140 MMOL/L — SIGNIFICANT CHANGE UP (ref 135–145)
SP GR SPEC: 1.01 — SIGNIFICANT CHANGE UP (ref 1–1.03)
UROBILINOGEN FLD QL: 0.2 MG/DL — SIGNIFICANT CHANGE UP (ref 0.2–1)
WBC # BLD: 9.12 K/UL — SIGNIFICANT CHANGE UP (ref 3.8–10.5)
WBC # FLD AUTO: 9.12 K/UL — SIGNIFICANT CHANGE UP (ref 3.8–10.5)

## 2024-05-12 PROCEDURE — 36415 COLL VENOUS BLD VENIPUNCTURE: CPT

## 2024-05-12 PROCEDURE — 99285 EMERGENCY DEPT VISIT HI MDM: CPT | Mod: 25

## 2024-05-12 PROCEDURE — 72170 X-RAY EXAM OF PELVIS: CPT | Mod: 26

## 2024-05-12 PROCEDURE — 72125 CT NECK SPINE W/O DYE: CPT | Mod: 26,MC

## 2024-05-12 PROCEDURE — 97162 PT EVAL MOD COMPLEX 30 MIN: CPT | Mod: GP

## 2024-05-12 PROCEDURE — 73030 X-RAY EXAM OF SHOULDER: CPT | Mod: 26,LT

## 2024-05-12 PROCEDURE — 12002 RPR S/N/AX/GEN/TRNK2.6-7.5CM: CPT

## 2024-05-12 PROCEDURE — 82607 VITAMIN B-12: CPT

## 2024-05-12 PROCEDURE — 70450 CT HEAD/BRAIN W/O DYE: CPT | Mod: 26,MC

## 2024-05-12 PROCEDURE — 80053 COMPREHEN METABOLIC PANEL: CPT

## 2024-05-12 PROCEDURE — 97116 GAIT TRAINING THERAPY: CPT | Mod: GP

## 2024-05-12 PROCEDURE — 85025 COMPLETE CBC W/AUTO DIFF WBC: CPT

## 2024-05-12 PROCEDURE — 84443 ASSAY THYROID STIM HORMONE: CPT

## 2024-05-12 PROCEDURE — 97530 THERAPEUTIC ACTIVITIES: CPT | Mod: GP

## 2024-05-12 RX ORDER — CHOLECALCIFEROL (VITAMIN D3) 125 MCG
1 CAPSULE ORAL
Qty: 0 | Refills: 0 | DISCHARGE

## 2024-05-12 RX ORDER — TETANUS TOXOID, REDUCED DIPHTHERIA TOXOID AND ACELLULAR PERTUSSIS VACCINE, ADSORBED 5; 2.5; 8; 8; 2.5 [IU]/.5ML; [IU]/.5ML; UG/.5ML; UG/.5ML; UG/.5ML
0.5 SUSPENSION INTRAMUSCULAR ONCE
Refills: 0 | Status: COMPLETED | OUTPATIENT
Start: 2024-05-12 | End: 2024-05-12

## 2024-05-12 RX ORDER — PANTOPRAZOLE SODIUM 20 MG/1
1 TABLET, DELAYED RELEASE ORAL
Qty: 0 | Refills: 0 | DISCHARGE

## 2024-05-12 RX ORDER — EZETIMIBE 10 MG/1
1 TABLET ORAL
Qty: 0 | Refills: 0 | DISCHARGE

## 2024-05-12 RX ORDER — ALPRAZOLAM 0.25 MG
1 TABLET ORAL
Refills: 0 | DISCHARGE

## 2024-05-12 RX ORDER — ACETAMINOPHEN 500 MG
650 TABLET ORAL ONCE
Refills: 0 | Status: COMPLETED | OUTPATIENT
Start: 2024-05-12 | End: 2024-05-12

## 2024-05-12 RX ORDER — CEFTRIAXONE 500 MG/1
1000 INJECTION, POWDER, FOR SOLUTION INTRAMUSCULAR; INTRAVENOUS ONCE
Refills: 0 | Status: COMPLETED | OUTPATIENT
Start: 2024-05-12 | End: 2024-05-12

## 2024-05-12 RX ORDER — FERROUS SULFATE 325(65) MG
1 TABLET ORAL
Refills: 0 | DISCHARGE

## 2024-05-12 RX ORDER — CLOPIDOGREL BISULFATE 75 MG/1
1 TABLET, FILM COATED ORAL
Qty: 0 | Refills: 0 | DISCHARGE

## 2024-05-12 RX ORDER — CARBIDOPA AND LEVODOPA 25; 100 MG/1; MG/1
2 TABLET ORAL
Qty: 0 | Refills: 0 | DISCHARGE

## 2024-05-12 RX ORDER — MULTIVIT-MIN/FERROUS GLUCONATE 9 MG/15 ML
1 LIQUID (ML) ORAL
Qty: 0 | Refills: 0 | DISCHARGE

## 2024-05-12 RX ORDER — CEFTRIAXONE 500 MG/1
1000 INJECTION, POWDER, FOR SOLUTION INTRAMUSCULAR; INTRAVENOUS ONCE
Refills: 0 | Status: DISCONTINUED | OUTPATIENT
Start: 2024-05-12 | End: 2024-05-12

## 2024-05-12 RX ADMIN — TETANUS TOXOID, REDUCED DIPHTHERIA TOXOID AND ACELLULAR PERTUSSIS VACCINE, ADSORBED 0.5 MILLILITER(S): 5; 2.5; 8; 8; 2.5 SUSPENSION INTRAMUSCULAR at 16:36

## 2024-05-12 RX ADMIN — CEFTRIAXONE 1000 MILLIGRAM(S): 500 INJECTION, POWDER, FOR SOLUTION INTRAMUSCULAR; INTRAVENOUS at 20:32

## 2024-05-12 RX ADMIN — Medication 650 MILLIGRAM(S): at 16:35

## 2024-05-12 NOTE — ED PROVIDER NOTE - CARE PLAN
1 Principal Discharge DX:	Acute cystitis  Secondary Diagnosis:	Generalized weakness  Secondary Diagnosis:	Laceration of scalp   Principal Discharge DX:	Acute cystitis  Secondary Diagnosis:	Generalized weakness  Secondary Diagnosis:	Laceration of scalp  Secondary Diagnosis:	Multiple thyroid nodules

## 2024-05-12 NOTE — ED PROVIDER NOTE - PHYSICAL EXAMINATION
GEN: Pt in NAD, A&O x3. GCS 15  EYES: Sclera white w/o injection, PERRLA, EOMI.  ENT: Head NCAT. Nose without deformity, turbinates without edema or erythema, no DC. No auricular TTP. Mouth and pharynx without erythema or exudates, uvula midline, no tonsillar enlargement. Neck supple FROM.   RESP: No chest wall tenderness, CTA b/l, no wheezes, rales, or rhonchi.   CARDIAC: RRR, clear distinct S1, S2, no murmurs, gallops, or rubs.   ABD: Abdomen non-distended, soft, non-tender, no rebound or guarding. No CVAT b/l.  VASC: 2+ carotid, radial, and dorsalis pedis pulses b/l. No edema or tenderness of the lower extremities.  NEURO: CN 2-12 grossly intact. Normal and equal sensation UE, LE and face b/l. 5/5 strength UE and LE b/l.  Pronator drift negative. Normal gait and gross cerebellar functioning.  MSK: No joint erythema or obvious deformities. Spine without obvious deformity. No midline or paraspinal tenderness. FROM w/o pain of upper and lower extremities b/l.  SKIN: No rashes noted.  HEAD: abrasion to the posterior occiput GEN: Pt in NAD, A&O x3, GCS15  EYES: Sclera white w/o injection, PERRLA, EOMI.  ENT: (+) 4cm scalp laceration along left occipital region of scalp. Palpable mobile mass approx 3 cm along left posterior neck, likely lipoma  RESP: No chest wall tenderness, CTA b/l, no wheezes, rales, or rhonchi.   CARDIAC: RRR, clear distinct S1, S2, no murmurs, gallops, or rubs.   ABD: Abdomen non-distended, soft, non-tender, no rebound or guarding. No CVAT b/l.  VASC: 2+ carotid, radial, and dorsalis pedis pulses b/l. No edema or tenderness of the lower extremities.  NEURO: CN 2-12 grossly intact. Normal and equal sensation UE, LE and face b/l. 5/5 strength UE and LE b/l.  Pronator drift negative. Normal gait and gross cerebellar functioning.  MSK: No joint erythema or obvious deformities. Spine without obvious deformity. No midline or paraspinal tenderness. FROM w/o pain of upper and lower extremities b/l.  SKIN: No rashes noted.  HEAD: abrasion to the posterior occiput GEN: Pt in NAD, A&O x2, GCS15  EYES: Sclera white w/o injection, PERRLA, EOMI.  ENT: (+) 4cm scalp laceration along left occipital region of scalp, extends full thickness of scalp. No visualized galeal involvement. Palpable mobile mass approx 3 cm along left posterior neck, likely lipoma. Patient states she has always had the mass.  RESP: No chest wall tenderness, CTA b/l, no wheezes, rales, or rhonchi.   CARDIAC: RRR, clear distinct S1, S2, no murmurs, gallops, or rubs.   ABD: Abdomen non-distended, soft, non-tender, no rebound or guarding. No CVAT b/l.  VASC: 2+ carotid, radial, and dorsalis pedis pulses b/l. No edema or tenderness of the lower extremities.  NEURO: CN 2-12 grossly intact. Normal and equal sensation UE, LE and face b/l. 5/5 strength UE and LE b/l.    MSK: No joint erythema or obvious deformities. Spine without obvious deformity. No midline or paraspinal tenderness. FROM w/o pain of upper and lower extremities b/l.  SKIN: (+) Left shoulder abrasion  HEAD: laceration to the posterior occiput

## 2024-05-12 NOTE — ED ADULT NURSE NOTE - CHIEF COMPLAINT QUOTE
Pt BIBEMS from [place] assisted living for unwitnessed fall out of wheelchair. Pt a&ox2 at baseline, abrasion noted to left shoulder. Pt c/o soreness to left shoulder. -bloodthinners, no loc. NA called at 1431. NKDA.

## 2024-05-12 NOTE — ED ADULT NURSE NOTE - NSFALLFACTORS_ED_ALL_ED
Detail Level: Generalized Impaired gait Detail Level: Simple Detail Level: Detailed Skin Checks Recommendations: Full body skin check annually Sunscreen Recommendations: applied daily to exposed areas Detail Level: Zone

## 2024-05-12 NOTE — ED PROVIDER NOTE - PROGRESS NOTE DETAILS
Laceration repaired at bedside. See procedure note for details.     Patient has 7 staples to posterior occipital lobe that would require removal in 7-10 days (5/19/24-5/22/24). She will require a wound check during this hospitalization on day 3 (5/15/24). Labs reviewed, patient's electrolytes wnl. CT head demonstrated no acute ICH. CT c-spine demonstrated incidental thyroid nodules, and these results were communicated to both the patient and her daughter Key at bedside. XRAY of left shoulder demonstrated no acute fractures or dislocations. Patient on re-examination has full range of motion in left shoulder. Labs reviewed, no leukocytosis, no anemia, electrolytes otherwise within normal limits.    UA reviewed, patient has (+) bacteria with (+) wbc concerning for UTI. Given family's concern of patient's recent cognitive decline, concern for UTI as infectious source causing change in mentation vs. advancement of Parkinson's disease. May benefit from neurology consult during this admission to evaluate for optimization of Parkinson's maintenance medications.    Will recommend patient for admission for IV antibiotics of UTI management. Patient will require acute rehab placement after further discussion with daughter Key.

## 2024-05-12 NOTE — ED PROVIDER NOTE - WR INTERPRETATION 2
No acute fractures visualized. Decreased space in bilateral femoral acetabular joints suggestive of moderate osteoarthritis.

## 2024-05-12 NOTE — ED PROVIDER NOTE - NSFOLLOWUPINSTRUCTIONS_ED_ALL_ED_FT
You have 7 staples to your scalp that would require removal in 7 to 10 days (5/19/24-5/22/24). You will require a wound check on day 3 (5/15/24).

## 2024-05-12 NOTE — ED PROVIDER NOTE - OBJECTIVE STATEMENT
y/o female with a PMHx of CAD, HTN, anemia, and parkinson's presents to the ED c/o s/p fall out of wheelchair. Pt denies LOC, abdominal, pelvic, back or chest pain. Pt endorses pain to back of head. Pt has no known medical allergies. y/o female with a PMHx of CAD, HTN, anemia, and parkinson's presents to the ED c/o s/p fall out of wheelchair. Pt denies LOC, abdominal, pelvic, back or chest pain. Pt endorses pain to back of head. Pt has no known medical allergies.    Collateral obtained from daughter bonnie who is worried about her increased frequency of falling and general decline. Interested in acute rehab placement for physical therapy sessions and increased assistance. 87y/o female with a PMHx of CAD, HTN, anemia, and parkinson's presents to the ED BIBEMS from [facility] assisted living facility c/o s/p fall out of wheelchair. Pt denies LOC, abdominal, pelvic, back or chest pain. Pt endorses pain to back of head, seems to have struck head on an object during fall but patient cannot recall what object. Denied feeling lightheaded, dizzy, nauseous prior to fall.    Collateral obtained from daughter, Key, who is worried about her increased frequency of falling and general decline at assisted living facility. She is concerned that patient is having worsening cognitive decline and is Interested in acute rehab placement for physical therapy sessions and increased assistance.

## 2024-05-12 NOTE — ED ADULT NURSE REASSESSMENT NOTE - NS ED NURSE REASSESS COMMENT FT1
Assumed care of pt at 1900. Pt in view of nursing station, fall precautions in place, bed in lowest position. Bed alarm in place.

## 2024-05-12 NOTE — ED ADULT TRIAGE NOTE - CHIEF COMPLAINT QUOTE
Pt BIBEMS from [place] assisted living for unwitnessed fall out of wheelchair. Pt a&ox2 at baseline, abrasion noted to left shoulder. Pt c/o soreness to left shoulder. -bloodthinners, no loc. NA called at 1431. NKDA.

## 2024-05-12 NOTE — ED PROCEDURE NOTE - PROCEDURE ADDITIONAL DETAILS
Initial concern for galeal involvement but on further inspection galea intact. 7 staples placed. Dressed with bacitracin

## 2024-05-12 NOTE — ED ADULT NURSE NOTE - OBJECTIVE STATEMENT
Pt BIBEMS from [place] assisted living s/p unwitnessed fall out of wheelchair. a/o2 at baseline. abrasion present to left shoulder. -blood thinners -loc -allergies

## 2024-05-12 NOTE — ED PROVIDER NOTE - CLINICAL SUMMARY MEDICAL DECISION MAKING FREE TEXT BOX
Plan for CT head and C-spine, X-ray, basic labs. Patient presents to ER from [facility] living [facility] s/p mechanical fall.     Will obtain CT head, CT c-spine to r/o acute intracranial hemorrhage and traumatic fx to cervical spine.     Will obtain XRAY shoulder (left) and XRAY pelvis to rule out acute fractures.    Will obtain CMP and CBC to rule out electrolyte derangements or anemia that may have increased patient's risk of falling.

## 2024-05-13 LAB
ALBUMIN SERPL ELPH-MCNC: 3 G/DL — LOW (ref 3.3–5)
ALP SERPL-CCNC: 93 U/L — SIGNIFICANT CHANGE UP (ref 40–120)
ALT FLD-CCNC: 22 U/L — SIGNIFICANT CHANGE UP (ref 12–78)
ANION GAP SERPL CALC-SCNC: 5 MMOL/L — SIGNIFICANT CHANGE UP (ref 5–17)
AST SERPL-CCNC: 14 U/L — LOW (ref 15–37)
BASOPHILS # BLD AUTO: 0.08 K/UL — SIGNIFICANT CHANGE UP (ref 0–0.2)
BASOPHILS NFR BLD AUTO: 1 % — SIGNIFICANT CHANGE UP (ref 0–2)
BILIRUB SERPL-MCNC: 0.5 MG/DL — SIGNIFICANT CHANGE UP (ref 0.2–1.2)
BUN SERPL-MCNC: 11 MG/DL — SIGNIFICANT CHANGE UP (ref 7–23)
CALCIUM SERPL-MCNC: 9 MG/DL — SIGNIFICANT CHANGE UP (ref 8.5–10.1)
CHLORIDE SERPL-SCNC: 108 MMOL/L — SIGNIFICANT CHANGE UP (ref 96–108)
CO2 SERPL-SCNC: 27 MMOL/L — SIGNIFICANT CHANGE UP (ref 22–31)
CREAT SERPL-MCNC: 0.58 MG/DL — SIGNIFICANT CHANGE UP (ref 0.5–1.3)
EGFR: 88 ML/MIN/1.73M2 — SIGNIFICANT CHANGE UP
EOSINOPHIL # BLD AUTO: 0.13 K/UL — SIGNIFICANT CHANGE UP (ref 0–0.5)
EOSINOPHIL NFR BLD AUTO: 1.6 % — SIGNIFICANT CHANGE UP (ref 0–6)
GLUCOSE SERPL-MCNC: 112 MG/DL — HIGH (ref 70–99)
HCT VFR BLD CALC: 39.7 % — SIGNIFICANT CHANGE UP (ref 34.5–45)
HGB BLD-MCNC: 13 G/DL — SIGNIFICANT CHANGE UP (ref 11.5–15.5)
IMM GRANULOCYTES NFR BLD AUTO: 0.2 % — SIGNIFICANT CHANGE UP (ref 0–0.9)
LYMPHOCYTES # BLD AUTO: 1.61 K/UL — SIGNIFICANT CHANGE UP (ref 1–3.3)
LYMPHOCYTES # BLD AUTO: 20.1 % — SIGNIFICANT CHANGE UP (ref 13–44)
MCHC RBC-ENTMCNC: 30.4 PG — SIGNIFICANT CHANGE UP (ref 27–34)
MCHC RBC-ENTMCNC: 32.7 GM/DL — SIGNIFICANT CHANGE UP (ref 32–36)
MCV RBC AUTO: 93 FL — SIGNIFICANT CHANGE UP (ref 80–100)
MONOCYTES # BLD AUTO: 0.81 K/UL — SIGNIFICANT CHANGE UP (ref 0–0.9)
MONOCYTES NFR BLD AUTO: 10.1 % — SIGNIFICANT CHANGE UP (ref 2–14)
NEUTROPHILS # BLD AUTO: 5.37 K/UL — SIGNIFICANT CHANGE UP (ref 1.8–7.4)
NEUTROPHILS NFR BLD AUTO: 67 % — SIGNIFICANT CHANGE UP (ref 43–77)
PLATELET # BLD AUTO: 255 K/UL — SIGNIFICANT CHANGE UP (ref 150–400)
POTASSIUM SERPL-MCNC: 3.7 MMOL/L — SIGNIFICANT CHANGE UP (ref 3.5–5.3)
POTASSIUM SERPL-SCNC: 3.7 MMOL/L — SIGNIFICANT CHANGE UP (ref 3.5–5.3)
PROT SERPL-MCNC: 6.6 GM/DL — SIGNIFICANT CHANGE UP (ref 6–8.3)
RBC # BLD: 4.27 M/UL — SIGNIFICANT CHANGE UP (ref 3.8–5.2)
RBC # FLD: 13.6 % — SIGNIFICANT CHANGE UP (ref 10.3–14.5)
SODIUM SERPL-SCNC: 140 MMOL/L — SIGNIFICANT CHANGE UP (ref 135–145)
TSH SERPL-MCNC: 3.41 UU/ML — SIGNIFICANT CHANGE UP (ref 0.34–4.82)
VIT B12 SERPL-MCNC: 587 PG/ML — SIGNIFICANT CHANGE UP (ref 232–1245)
WBC # BLD: 8.02 K/UL — SIGNIFICANT CHANGE UP (ref 3.8–10.5)
WBC # FLD AUTO: 8.02 K/UL — SIGNIFICANT CHANGE UP (ref 3.8–10.5)

## 2024-05-13 PROCEDURE — 99223 1ST HOSP IP/OBS HIGH 75: CPT

## 2024-05-13 RX ORDER — MULTIVIT-MIN/FERROUS GLUCONATE 9 MG/15 ML
1 LIQUID (ML) ORAL DAILY
Refills: 0 | Status: DISCONTINUED | OUTPATIENT
Start: 2024-05-13 | End: 2024-05-15

## 2024-05-13 RX ORDER — FOLIC ACID 0.8 MG
1 TABLET ORAL
Refills: 0 | DISCHARGE

## 2024-05-13 RX ORDER — FOLIC ACID 0.8 MG
1 TABLET ORAL DAILY
Refills: 0 | Status: DISCONTINUED | OUTPATIENT
Start: 2024-05-13 | End: 2024-05-15

## 2024-05-13 RX ORDER — MIRTAZAPINE 45 MG/1
7.5 TABLET, ORALLY DISINTEGRATING ORAL AT BEDTIME
Refills: 0 | Status: DISCONTINUED | OUTPATIENT
Start: 2024-05-13 | End: 2024-05-15

## 2024-05-13 RX ORDER — ACETAMINOPHEN 500 MG
650 TABLET ORAL EVERY 6 HOURS
Refills: 0 | Status: DISCONTINUED | OUTPATIENT
Start: 2024-05-13 | End: 2024-05-15

## 2024-05-13 RX ORDER — MIRTAZAPINE 45 MG/1
0.5 TABLET, ORALLY DISINTEGRATING ORAL
Refills: 0 | DISCHARGE

## 2024-05-13 RX ORDER — LANOLIN ALCOHOL/MO/W.PET/CERES
3 CREAM (GRAM) TOPICAL AT BEDTIME
Refills: 0 | Status: DISCONTINUED | OUTPATIENT
Start: 2024-05-13 | End: 2024-05-15

## 2024-05-13 RX ORDER — ACETAMINOPHEN 500 MG
2 TABLET ORAL
Refills: 0 | DISCHARGE

## 2024-05-13 RX ORDER — SENNA PLUS 8.6 MG/1
2 TABLET ORAL
Refills: 0 | DISCHARGE

## 2024-05-13 RX ORDER — ATENOLOL 25 MG/1
50 TABLET ORAL EVERY 12 HOURS
Refills: 0 | Status: DISCONTINUED | OUTPATIENT
Start: 2024-05-13 | End: 2024-05-15

## 2024-05-13 RX ORDER — DOCUSATE SODIUM 100 MG
3 CAPSULE ORAL
Refills: 0 | DISCHARGE

## 2024-05-13 RX ORDER — ALPRAZOLAM 0.25 MG
0.5 TABLET ORAL AT BEDTIME
Refills: 0 | Status: DISCONTINUED | OUTPATIENT
Start: 2024-05-13 | End: 2024-05-15

## 2024-05-13 RX ORDER — FERROUS SULFATE 325(65) MG
325 TABLET ORAL DAILY
Refills: 0 | Status: DISCONTINUED | OUTPATIENT
Start: 2024-05-13 | End: 2024-05-15

## 2024-05-13 RX ORDER — SENNA PLUS 8.6 MG/1
2 TABLET ORAL AT BEDTIME
Refills: 0 | Status: DISCONTINUED | OUTPATIENT
Start: 2024-05-13 | End: 2024-05-15

## 2024-05-13 RX ORDER — CHOLECALCIFEROL (VITAMIN D3) 125 MCG
5000 CAPSULE ORAL DAILY
Refills: 0 | Status: DISCONTINUED | OUTPATIENT
Start: 2024-05-13 | End: 2024-05-15

## 2024-05-13 RX ORDER — AMLODIPINE BESYLATE 2.5 MG/1
2.5 TABLET ORAL DAILY
Refills: 0 | Status: DISCONTINUED | OUTPATIENT
Start: 2024-05-13 | End: 2024-05-14

## 2024-05-13 RX ORDER — CEFTRIAXONE 500 MG/1
1000 INJECTION, POWDER, FOR SOLUTION INTRAMUSCULAR; INTRAVENOUS EVERY 24 HOURS
Refills: 0 | Status: DISCONTINUED | OUTPATIENT
Start: 2024-05-13 | End: 2024-05-15

## 2024-05-13 RX ORDER — LANOLIN ALCOHOL/MO/W.PET/CERES
1 CREAM (GRAM) TOPICAL
Refills: 0 | DISCHARGE

## 2024-05-13 RX ORDER — LOSARTAN POTASSIUM 100 MG/1
25 TABLET, FILM COATED ORAL
Refills: 0 | Status: DISCONTINUED | OUTPATIENT
Start: 2024-05-13 | End: 2024-05-15

## 2024-05-13 RX ORDER — CARBIDOPA AND LEVODOPA 25; 100 MG/1; MG/1
2 TABLET ORAL
Refills: 0 | Status: DISCONTINUED | OUTPATIENT
Start: 2024-05-13 | End: 2024-05-15

## 2024-05-13 RX ADMIN — CEFTRIAXONE 1000 MILLIGRAM(S): 500 INJECTION, POWDER, FOR SOLUTION INTRAMUSCULAR; INTRAVENOUS at 20:14

## 2024-05-13 RX ADMIN — Medication 3 MILLIGRAM(S): at 21:51

## 2024-05-13 RX ADMIN — LOSARTAN POTASSIUM 25 MILLIGRAM(S): 100 TABLET, FILM COATED ORAL at 11:54

## 2024-05-13 RX ADMIN — MIRTAZAPINE 7.5 MILLIGRAM(S): 45 TABLET, ORALLY DISINTEGRATING ORAL at 21:51

## 2024-05-13 RX ADMIN — LOSARTAN POTASSIUM 25 MILLIGRAM(S): 100 TABLET, FILM COATED ORAL at 21:51

## 2024-05-13 RX ADMIN — Medication 1 TABLET(S): at 11:55

## 2024-05-13 RX ADMIN — ATENOLOL 50 MILLIGRAM(S): 25 TABLET ORAL at 21:51

## 2024-05-13 RX ADMIN — Medication 0.5 MILLIGRAM(S): at 21:51

## 2024-05-13 RX ADMIN — CARBIDOPA AND LEVODOPA 2 TABLET(S): 25; 100 TABLET ORAL at 18:38

## 2024-05-13 RX ADMIN — CARBIDOPA AND LEVODOPA 2 TABLET(S): 25; 100 TABLET ORAL at 11:54

## 2024-05-13 RX ADMIN — SENNA PLUS 2 TABLET(S): 8.6 TABLET ORAL at 21:51

## 2024-05-13 RX ADMIN — Medication 325 MILLIGRAM(S): at 11:54

## 2024-05-13 RX ADMIN — Medication 5000 UNIT(S): at 16:00

## 2024-05-13 RX ADMIN — ATENOLOL 50 MILLIGRAM(S): 25 TABLET ORAL at 11:55

## 2024-05-13 RX ADMIN — Medication 1 MILLIGRAM(S): at 11:54

## 2024-05-13 RX ADMIN — AMLODIPINE BESYLATE 2.5 MILLIGRAM(S): 2.5 TABLET ORAL at 08:34

## 2024-05-13 NOTE — ED ADULT NURSE REASSESSMENT NOTE - NS ED NURSE REASSESS COMMENT FT1
Spoke to Dr. Mondragon regarding hypertension. Pt awake and alert, at baseline mental status. Side rails up. Respirations even and unlabored. Safety and comfort measures maintained.

## 2024-05-13 NOTE — PATIENT PROFILE ADULT - FALL HARM RISK - HARM RISK INTERVENTIONS

## 2024-05-13 NOTE — PHYSICAL THERAPY INITIAL EVALUATION ADULT - PERTINENT HX OF CURRENT PROBLEM, REHAB EVAL
89 y/o F w/ PMH of CAD, HTN, GERD, anemia, parkinson's dementia, p/w mechanical fall. Patient states that she doesn't recall why she's in the hospital. When mentioned fall, patient does begin to recall it. However, she doesn't recall exact mechanism of fall. Previously patient's daughter had reported to ED staff that she was  concerned about increased ferquency of falling and general decline of patient, and is interested rehab placement for physical therapy and increased assistance. Denies cough, runny nose, sore throat, nausea, vomiting, abdominal pain, fever, chills, dysuria, increased frequency

## 2024-05-13 NOTE — H&P ADULT - HISTORY OF PRESENT ILLNESS
89 y/o F w/ PMH of CAD, HTN, GERD, anemia, parkinson's dementia, p/w mechanical fall. Patient states that she doesn't recall why she's in the hospital. When mentioned fall, patient does begin to recall it. However, she doesn't recall exact mechanism of fall. Previously patient's daughter had reported to ED staff that she was  concerned about increased ferquency of falling and general decline of patient, and is interested rehab placement for physical therapy and increased assistance. Denies cough, runny nose, sore throat, nausea, vomiting, abdominal pain, fever, chills, dysuria, increased frequency     PSH: Denies     Social Hx: Denies tobacco / etoh / drugs     Family Hx: Mother - brain cancer, father - alzheimers

## 2024-05-13 NOTE — H&P ADULT - ASSESSMENT
89 y/o F w/ PMH of CAD, HTN, GERD, anemia, parkinson's dementia, p/w fall.    *Increased weakness / falls w/ h/o Parkinson's   -Neuro consult for medication optimization   -PT consult  -Fall precautions  -TSH / B12 / UA   -SW consult   -CTH: No acute findings. +Atheromatous plaque at both carotid bulbs (will order carotid U/S)  -4cm scalp laceration s/p repair in ED     *UTI  -Ceftriaxone  -F/u urine cx    *Multinodular thyroid gland on CT  -TSH  -F/u outpatient Endo and outpatient thyroid U/S for further work up     *H/o CAD / HTN / GERD / anemia / parkinsons  -C/w home meds and f/u outpatient for further management     *DVT ppx   -SCDs     >75 mins required for admission  89 y/o F w/ PMH of CAD, HTN, GERD, anemia, parkinson's dementia, p/w fall.    *Increased weakness / falls w/ h/o Parkinson's   -Neuro consult for medication optimization   -PT consult  -Fall precautions  -TSH / B12   -SW consult   -CTH: No acute findings. +Atheromatous plaque at both carotid bulbs (will order carotid U/S)  -4cm scalp laceration s/p repair in ED     *UTI  -Ceftriaxone  -F/u urine cx    *Multinodular thyroid gland on CT  -TSH  -F/u outpatient Endo and outpatient thyroid U/S for further work up     *H/o CAD / HTN / GERD / anemia / parkinsons  -C/w home meds and f/u outpatient for further management     *DVT ppx   -SCDs     >75 mins required for admission
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
## 2024-05-14 LAB
CULTURE RESULTS: ABNORMAL
SPECIMEN SOURCE: SIGNIFICANT CHANGE UP

## 2024-05-14 PROCEDURE — 99232 SBSQ HOSP IP/OBS MODERATE 35: CPT

## 2024-05-14 RX ORDER — AMLODIPINE BESYLATE 2.5 MG/1
5 TABLET ORAL DAILY
Refills: 0 | Status: DISCONTINUED | OUTPATIENT
Start: 2024-05-14 | End: 2024-05-15

## 2024-05-14 RX ADMIN — MIRTAZAPINE 7.5 MILLIGRAM(S): 45 TABLET, ORALLY DISINTEGRATING ORAL at 21:23

## 2024-05-14 RX ADMIN — Medication 0.5 MILLIGRAM(S): at 21:28

## 2024-05-14 RX ADMIN — Medication 1 TABLET(S): at 10:14

## 2024-05-14 RX ADMIN — ATENOLOL 50 MILLIGRAM(S): 25 TABLET ORAL at 21:23

## 2024-05-14 RX ADMIN — CARBIDOPA AND LEVODOPA 2 TABLET(S): 25; 100 TABLET ORAL at 10:13

## 2024-05-14 RX ADMIN — AMLODIPINE BESYLATE 2.5 MILLIGRAM(S): 2.5 TABLET ORAL at 10:12

## 2024-05-14 RX ADMIN — Medication 325 MILLIGRAM(S): at 10:13

## 2024-05-14 RX ADMIN — Medication 5000 UNIT(S): at 10:13

## 2024-05-14 RX ADMIN — CEFTRIAXONE 1000 MILLIGRAM(S): 500 INJECTION, POWDER, FOR SOLUTION INTRAMUSCULAR; INTRAVENOUS at 20:15

## 2024-05-14 RX ADMIN — ATENOLOL 50 MILLIGRAM(S): 25 TABLET ORAL at 10:12

## 2024-05-14 RX ADMIN — CARBIDOPA AND LEVODOPA 2 TABLET(S): 25; 100 TABLET ORAL at 20:14

## 2024-05-14 RX ADMIN — Medication 3 MILLIGRAM(S): at 21:28

## 2024-05-14 RX ADMIN — Medication 1 MILLIGRAM(S): at 10:14

## 2024-05-14 RX ADMIN — LOSARTAN POTASSIUM 25 MILLIGRAM(S): 100 TABLET, FILM COATED ORAL at 10:14

## 2024-05-14 RX ADMIN — LOSARTAN POTASSIUM 25 MILLIGRAM(S): 100 TABLET, FILM COATED ORAL at 21:28

## 2024-05-14 RX ADMIN — SENNA PLUS 2 TABLET(S): 8.6 TABLET ORAL at 21:28

## 2024-05-14 NOTE — DIETITIAN INITIAL EVALUATION ADULT - ETIOLOGY
r/t decreased ability to meet estimated nutrient needs 2/2 advanced age w/ dementia and increased weakness

## 2024-05-14 NOTE — DIETITIAN INITIAL EVALUATION ADULT - ORAL INTAKE PTA/DIET HISTORY
As per paper chart, from [facility], was on regular diet w/ regular consistencies. Obtained limited diet hx from pt 2/2 dementia, however, reports "good" appetite and consumes 3 meals/day at home, ? accuracy. C/o occasional ? constipation or diarrhea.

## 2024-05-14 NOTE — PROGRESS NOTE ADULT - SUBJECTIVE AND OBJECTIVE BOX
Hospital Medicine, [facility]     Chief Complaint:  Falls     SUBJECTIVE / OVERNIGHT EVENTS:  No acute overnight events.      Patient denies chest pain, SOB, abd pain, N/V, fever, chills, dysuria or any other complaints. All remainder ROS negative.     MEDICATIONS  (STANDING):  ALPRAZolam 0.5 milliGRAM(s) Oral at bedtime  amLODIPine   Tablet 2.5 milliGRAM(s) Oral daily  atenolol  Tablet 50 milliGRAM(s) Oral every 12 hours  carbidopa/levodopa  25/100 2 Tablet(s) Oral <User Schedule>  cefTRIAXone Injectable. 1000 milliGRAM(s) IV Push every 24 hours  cholecalciferol 5000 Unit(s) Oral daily  ferrous    sulfate 325 milliGRAM(s) Oral daily  folic acid 1 milliGRAM(s) Oral daily  losartan 25 milliGRAM(s) Oral two times a day  melatonin 3 milliGRAM(s) Oral at bedtime  mirtazapine 7.5 milliGRAM(s) Oral at bedtime  multivitamin/minerals 1 Tablet(s) Oral daily  senna 2 Tablet(s) Oral at bedtime    MEDICATIONS  (PRN):  acetaminophen     Tablet .. 650 milliGRAM(s) Oral every 6 hours PRN Mild Pain (1 - 3)        I&O's Summary      PHYSICAL EXAM:  Vital Signs Last 24 Hrs  T(C): 36.9 (14 May 2024 08:43), Max: 36.9 (13 May 2024 21:50)  T(F): 98.4 (14 May 2024 08:43), Max: 98.4 (13 May 2024 21:50)  HR: 68 (14 May 2024 08:43) (68 - 86)  BP: 179/66 (14 May 2024 08:43) (163/73 - 179/66)  BP(mean): 99 (13 May 2024 21:50) (99 - 99)  RR: 18 (14 May 2024 08:43) (18 - 19)  SpO2: 97% (14 May 2024 08:43) (97% - 97%)    Parameters below as of 14 May 2024 08:43  Patient On (Oxygen Delivery Method): room air            CONSTITUTIONAL: NAD   ENMT: Moist oral mucosa, no pharyngeal injection   RESPIRATORY: Normal respiratory effort; lungs are clear to auscultation bilaterally  CARDIOVASCULAR: Regular rate and rhythm, normal S1 and S2, no murmur/rub/gallop; No lower extremity edema   ABDOMEN: Nontender to palpation, normoactive bowel sounds, no rebound   MUSCLOSKELETAL:  Normal gait; no clubbing or cyanosis of digits; no joint swelling or tenderness    PSYCH: A+O to person, place, and time; affect appropriate  SKIN: No rashes; no palpable lesions    LABS:                        13.0   8.02  )-----------( 255      ( 13 May 2024 06:45 )             39.7     05-13    140  |  108  |  11  ----------------------------<  112<H>  3.7   |  27  |  0.58    Ca    9.0      13 May 2024 06:45    TPro  6.6  /  Alb  3.0<L>  /  TBili  0.5  /  DBili  x   /  AST  14<L>  /  ALT  22  /  AlkPhos  93  05-13          Urinalysis Basic - ( 13 May 2024 06:45 )    Color: x / Appearance: x / SG: x / pH: x  Gluc: 112 mg/dL / Ketone: x  / Bili: x / Urobili: x   Blood: x / Protein: x / Nitrite: x   Leuk Esterase: x / RBC: x / WBC x   Sq Epi: x / Non Sq Epi: x / Bacteria: x        CAPILLARY BLOOD GLUCOSE            RADIOLOGY & ADDITIONAL TESTS:  Results Reviewed:   Imaging Personally Reviewed:  Electrocardiogram Personally Reviewed:                                          
Hospital Medicine, [facility]     Chief Complaint:  Falls     SUBJECTIVE / OVERNIGHT EVENTS:  No acute overnight events.      Patient denies chest pain, SOB, abd pain, N/V, fever, chills, dysuria or any other complaints. All remainder ROS negative.     MEDICATIONS  (STANDING):  ALPRAZolam 0.5 milliGRAM(s) Oral at bedtime  amLODIPine   Tablet 2.5 milliGRAM(s) Oral daily  atenolol  Tablet 50 milliGRAM(s) Oral every 12 hours  carbidopa/levodopa  25/100 2 Tablet(s) Oral <User Schedule>  cefTRIAXone Injectable. 1000 milliGRAM(s) IV Push every 24 hours  cholecalciferol 5000 Unit(s) Oral daily  ferrous    sulfate 325 milliGRAM(s) Oral daily  folic acid 1 milliGRAM(s) Oral daily  losartan 25 milliGRAM(s) Oral two times a day  melatonin 3 milliGRAM(s) Oral at bedtime  mirtazapine 7.5 milliGRAM(s) Oral at bedtime  multivitamin/minerals 1 Tablet(s) Oral daily  senna 2 Tablet(s) Oral at bedtime    MEDICATIONS  (PRN):  acetaminophen     Tablet .. 650 milliGRAM(s) Oral every 6 hours PRN Mild Pain (1 - 3)        I&O's Summary      PHYSICAL EXAM:  Vital Signs Last 24 Hrs  T(C): 36.7 (13 May 2024 08:02), Max: 37.2 (12 May 2024 14:35)  T(F): 98.1 (13 May 2024 08:02), Max: 98.9 (12 May 2024 14:35)  HR: 82 (13 May 2024 08:02) (71 - 84)  BP: 171/52 (13 May 2024 08:02) (141/71 - 186/79)  BP(mean): 92 (13 May 2024 05:44) (83 - 103)  RR: 19 (13 May 2024 08:02) (16 - 19)  SpO2: 94% (13 May 2024 08:02) (94% - 97%)    Parameters below as of 13 May 2024 08:02  Patient On (Oxygen Delivery Method): room air            CONSTITUTIONAL: NAD ill appearing frail   ENMT: Moist oral mucosa, no pharyngeal injection   RESPIRATORY: Normal respiratory effort; lungs are clear to auscultation bilaterally  CARDIOVASCULAR: Regular rate and rhythm, normal S1 and S2, no murmur   ABDOMEN: Nontender to palpation, normoactive bowel sounds, no rebound   PSYCH: A+O to person, place, and time; affect appropriate    SKIN: No rashes; no palpable lesions    LABS:                        13.0   8.02  )-----------( 255      ( 13 May 2024 06:45 )             39.7     05-13    140  |  108  |  11  ----------------------------<  112<H>  3.7   |  27  |  0.58    Ca    9.0      13 May 2024 06:45    TPro  6.6  /  Alb  3.0<L>  /  TBili  0.5  /  DBili  x   /  AST  14<L>  /  ALT  22  /  AlkPhos  93  05-13          Urinalysis Basic - ( 13 May 2024 06:45 )    Color: x / Appearance: x / SG: x / pH: x  Gluc: 112 mg/dL / Ketone: x  / Bili: x / Urobili: x   Blood: x / Protein: x / Nitrite: x   Leuk Esterase: x / RBC: x / WBC x   Sq Epi: x / Non Sq Epi: x / Bacteria: x        CAPILLARY BLOOD GLUCOSE            RADIOLOGY & ADDITIONAL TESTS:  Results Reviewed:   Imaging Personally Reviewed:  Electrocardiogram Personally Reviewed:

## 2024-05-14 NOTE — DIETITIAN INITIAL EVALUATION ADULT - ADD RECOMMEND
1. Liberalize diet to regular to maximize caloric and nutrient intake.   2. Encourage protein-rich foods, maximize food preferences   3. Ensure Max onc daily to optimize nutritional needs (provides 150 kcal, 30 g protein/ shake)   4. Consider obtaining vitamin D 25OH level to assess nutriture   5. Monitor bowel movements, if no BM for >3 days, consider implementing bowel regimen.   6. Recommend to add MVI w/minerals, Vit C 500 mg BID, add Zinc Sulfate 220 mg x 10 days to promote wound healing.   7. Confirm goals of care regarding nutrition support   RD will continue to monitor PO intake, labs, hydration, and wt prn.

## 2024-05-14 NOTE — PROGRESS NOTE ADULT - ASSESSMENT
89 y/o F w/ PMH of CAD, HTN, GERD, anemia, parkinson's dementia, p/w fall.    *Increased weakness / falls w/ h/o Parkinson's   - PT consult recommends MARCIA   -Fall precautions  -TSH / B12 - WNL   -CTH: No acute findings.   -4cm scalp laceration s/p repair in ED     *UTI  -Ceftriaxone  -F/u urine cx    *Multinodular thyroid gland on CT  -TSH within normal limits   -F/u outpatient Endo and outpatient thyroid U/S for further work up     *H/o CAD / HTN / GERD / anemia / parkinsons  -C/w home meds and f/u outpatient for further management     *DVT ppx   -SCDs     
89 y/o F w/ PMH of CAD, HTN, GERD, anemia, parkinson's dementia, p/w fall.    *Increased weakness / falls w/ h/o Parkinson's   - PT consult  -Fall precautions  -TSH / B12   -CTH: No acute findings.   -4cm scalp laceration s/p repair in ED     *UTI  -Ceftriaxone  -F/u urine cx    *Multinodular thyroid gland on CT  -TSH within normal limits   -F/u outpatient Endo and outpatient thyroid U/S for further work up     *H/o CAD / HTN / GERD / anemia / parkinsons  -C/w home meds and f/u outpatient for further management     *DVT ppx   -SCDs

## 2024-05-14 NOTE — DIETITIAN INITIAL EVALUATION ADULT - OTHER INFO
89 y/o F w/ PMH of CAD, HTN, GERD, anemia, parkinson's dementia, p/w mechanical fall. Patient states that she doesn't recall why she's in the hospital. When mentioned fall, patient does begin to recall it. However, she doesn't recall exact mechanism of fall. Previously patient's daughter had reported to ED staff that she was concerned about increased frequency of falling and general decline of patient, and is interested rehab placement for physical therapy and increased assistance. Admit for acute cystitis, multiple thyroid nodules, generalized weakness, and laceration of scalp.     Obtained limited diet/ wt hx 2/2 dementia. Upon RD visit this morning observed breakfast tray of eggs and pancakes ~75% consumed however required assistance opening containers/ packages, likely meeting 50-75% ENN while in HH. Reports UBW ~180# x ? time frame but c/o wt gain, ? accuracy. RD unable to obtain bed scale wt 2/2 bed not in required position. EMR wt doc'd 191# obtained on 5/13, appears accurate. No wt hx available, ? wt changes. NFPE reveals limited moderate muscle/fat wasting. Currently on DASH diet, recommend to liberalize diet to regular to maximize caloric and nutrient intake. Will trial Ensure Max once daily to optimize nutritional needs (provides 150 kcal, 30 g protein/ shake). See below for other recs.

## 2024-05-14 NOTE — DIETITIAN INITIAL EVALUATION ADULT - PERTINENT MEDS FT
MEDICATIONS  (STANDING):  ALPRAZolam 0.5 milliGRAM(s) Oral at bedtime  amLODIPine   Tablet 5 milliGRAM(s) Oral daily  atenolol  Tablet 50 milliGRAM(s) Oral every 12 hours  carbidopa/levodopa  25/100 2 Tablet(s) Oral <User Schedule>  cefTRIAXone Injectable. 1000 milliGRAM(s) IV Push every 24 hours  cholecalciferol 5000 Unit(s) Oral daily  ferrous    sulfate 325 milliGRAM(s) Oral daily  folic acid 1 milliGRAM(s) Oral daily  losartan 25 milliGRAM(s) Oral two times a day  melatonin 3 milliGRAM(s) Oral at bedtime  mirtazapine 7.5 milliGRAM(s) Oral at bedtime  multivitamin/minerals 1 Tablet(s) Oral daily  senna 2 Tablet(s) Oral at bedtime    MEDICATIONS  (PRN):  acetaminophen     Tablet .. 650 milliGRAM(s) Oral every 6 hours PRN Mild Pain (1 - 3)

## 2024-05-14 NOTE — DIETITIAN INITIAL EVALUATION ADULT - NSFNSPHYEXAMSKINFT_GEN_A_CORE
Danilo = 14  Pressure Injury 1: Bilateral:, heel, Stage I  Pressure Injury 2: coccyx, Stage I  Pressure Injury 3: Right:, elbow, Stage II

## 2024-05-14 NOTE — DIETITIAN INITIAL EVALUATION ADULT - PERTINENT LABORATORY DATA
05-13    140  |  108  |  11  ----------------------------<  112<H>  3.7   |  27  |  0.58    Ca    9.0      13 May 2024 06:45    TPro  6.6  /  Alb  3.0<L>  /  TBili  0.5  /  DBili  x   /  AST  14<L>  /  ALT  22  /  AlkPhos  93  05-13

## 2024-05-15 ENCOUNTER — TRANSCRIPTION ENCOUNTER (OUTPATIENT)
Age: 89
End: 2024-05-15

## 2024-05-15 VITALS
RESPIRATION RATE: 16 BRPM | DIASTOLIC BLOOD PRESSURE: 56 MMHG | TEMPERATURE: 98 F | OXYGEN SATURATION: 95 % | SYSTOLIC BLOOD PRESSURE: 116 MMHG | HEART RATE: 80 BPM

## 2024-05-15 PROCEDURE — 99239 HOSP IP/OBS DSCHRG MGMT >30: CPT

## 2024-05-15 RX ORDER — AMLODIPINE BESYLATE 2.5 MG/1
1 TABLET ORAL
Qty: 0 | Refills: 0 | DISCHARGE
Start: 2024-05-15

## 2024-05-15 RX ADMIN — LOSARTAN POTASSIUM 25 MILLIGRAM(S): 100 TABLET, FILM COATED ORAL at 11:04

## 2024-05-15 RX ADMIN — CARBIDOPA AND LEVODOPA 2 TABLET(S): 25; 100 TABLET ORAL at 19:00

## 2024-05-15 RX ADMIN — Medication 5000 UNIT(S): at 11:04

## 2024-05-15 RX ADMIN — Medication 325 MILLIGRAM(S): at 11:04

## 2024-05-15 RX ADMIN — Medication 1 TABLET(S): at 11:02

## 2024-05-15 RX ADMIN — Medication 1 MILLIGRAM(S): at 11:05

## 2024-05-15 RX ADMIN — AMLODIPINE BESYLATE 5 MILLIGRAM(S): 2.5 TABLET ORAL at 11:02

## 2024-05-15 RX ADMIN — CARBIDOPA AND LEVODOPA 2 TABLET(S): 25; 100 TABLET ORAL at 11:05

## 2024-05-15 RX ADMIN — ATENOLOL 50 MILLIGRAM(S): 25 TABLET ORAL at 11:02

## 2024-05-15 NOTE — DISCHARGE NOTE PROVIDER - NSDCMRMEDTOKEN_GEN_ALL_CORE_FT
ALPRAZolam 0.5 mg oral tablet: 1 tab(s) orally once a day (at bedtime)  amLODIPine 5 mg oral tablet: 1 tab(s) orally once a day  atenolol 50 mg oral tablet: 1 tab(s) orally every 12 hours  carbidopa-levodopa 25 mg-100 mg oral tablet: 2 tab(s) orally 2 times a day  Cert[employer]ite Senior oral tablet: 1 tab(s) orally once a day  docusate sodium 100 mg oral tablet: 3 tab(s) orally once a day (at bedtime)  ezetimibe 10 mg oral tablet: 1 tab(s) orally once a day  ferrous sulfate 325 mg (65 mg elemental iron) oral tablet: 1 tab(s) orally once a day  folic acid 0.4 mg oral tablet: 1 tab(s) orally once a day  losartan 25 mg oral tablet: 1 tab(s) orally 2 times a day  melatonin 3 mg oral tablet: 1 tab(s) orally once a day (at bedtime)  Remeron 15 mg oral tablet: 0.5 tab(s) orally once a day (at bedtime)  senna (sennosides) 8.6 mg oral tablet: 2 tab(s) orally once a day (at bedtime)  Tylenol 325 mg oral tablet: 2 tab(s) orally every 6 hours as needed for  Vitamin D3 125 mcg (5000 intl units) oral tablet: 1 tab(s) orally once a day

## 2024-05-15 NOTE — DISCHARGE NOTE PROVIDER - DISCHARGE DIET
Siderails up x 2  Hourly rounding  Call light in reach  Instructed to call for assist before attempting out of bed.   Remains free from falls and accidental injury at this time   Floor free from obstacles  Bed is locked and in lowest position  Adequate lighting provided  Bed alarm on, Red Falling star and Stay with Me signs posted DASH Diet

## 2024-05-15 NOTE — DISCHARGE NOTE PROVIDER - CARE PROVIDER_API CALL
Lyndon Garza  Internal Medicine  [address] [place] [address], [address]  [place] [address]  Phone: [phone]  Fax: [phone]  Follow Up Time:

## 2024-05-15 NOTE — DISCHARGE NOTE PROVIDER - NSDCCPCAREPLAN_GEN_ALL_CORE_FT
PRINCIPAL DISCHARGE DIAGNOSIS  Diagnosis: Acute cystitis  Assessment and Plan of Treatment: You were found to have a positive UA and treated      SECONDARY DISCHARGE DIAGNOSES  Diagnosis: Laceration of scalp  Assessment and Plan of Treatment: You laceration was treated.    Diagnosis: Multiple thyroid nodules  Assessment and Plan of Treatment: You have multiple thyroid nodules.  Please follow up with a dedicated thyroid US.  Your TSH was within normal limits    Diagnosis: Parkinsons disease  Assessment and Plan of Treatment: Your parkinsons medications may need an adjustment please follow up with your neurologist.    Diagnosis: CAD (coronary artery disease)  Assessment and Plan of Treatment: Please continue medications as prescribed.

## 2024-05-15 NOTE — DISCHARGE NOTE NURSING/CASE MANAGEMENT/SOCIAL WORK - NSDCPEFALRISK_GEN_ALL_CORE
For information on Fall & Injury Prevention, visit: [email or web address] OR  [email or web address] OR  https://www.cdc.gov/steadi/patient.html

## 2024-05-15 NOTE — DISCHARGE NOTE NURSING/CASE MANAGEMENT/SOCIAL WORK - PATIENT PORTAL LINK FT
You can access the FollowMyHealth Patient Portal offered by [facility] by registering at the following website: [email or web address]. By joining [facility]’s FollowMyHealth portal, you will also be able to view your health information using other applications (apps) compatible with our system.

## 2024-05-15 NOTE — DISCHARGE NOTE PROVIDER - HOSPITAL COURSE
87 y/o F w/ PMH of CAD, HTN, GERD, anemia, parkinson's dementia, p/w mechanical fall.   Patient had a positive UA although sensitivities came back as coug negative staph.  Patient was seen by PT and recommended for MARCIA.

## 2024-05-15 NOTE — DISCHARGE NOTE NURSING/CASE MANAGEMENT/SOCIAL WORK - NSDCVIVACCINE_GEN_ALL_CORE_FT
Tdap; 12-May-2024 16:36; Olivier Agudelo (RN); [employer]; [id number] (Exp. Date: 23-Nov-2025); IntraMuscular; Deltoid Left.; 0.5 milliLiter(s); VIS (VIS Published: 09-May-2013, VIS Presented: 12-May-2024);
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
## 2024-05-22 DIAGNOSIS — I25.10 ATHEROSCLEROTIC HEART DISEASE OF NATIVE CORONARY ARTERY WITHOUT ANGINA PECTORIS: ICD-10-CM

## 2024-05-22 DIAGNOSIS — K21.9 GASTRO-ESOPHAGEAL REFLUX DISEASE WITHOUT ESOPHAGITIS: ICD-10-CM

## 2024-05-22 DIAGNOSIS — E04.2 NONTOXIC MULTINODULAR GOITER: ICD-10-CM

## 2024-05-22 DIAGNOSIS — Z88.0 ALLERGY STATUS TO PENICILLIN: ICD-10-CM

## 2024-05-22 DIAGNOSIS — G20.A1 PARKINSON'S DISEASE WITHOUT DYSKINESIA, WITHOUT MENTION OF FLUCTUATIONS: ICD-10-CM

## 2024-05-22 DIAGNOSIS — F02.80 DEMENTIA IN OTHER DISEASES CLASSIFIED ELSEWHERE, UNSPECIFIED SEVERITY, WITHOUT BEHAVIORAL DISTURBANCE, PSYCHOTIC DISTURBANCE, MOOD DISTURBANCE, AND ANXIETY: ICD-10-CM

## 2024-05-22 DIAGNOSIS — I10 ESSENTIAL (PRIMARY) HYPERTENSION: ICD-10-CM

## 2024-05-22 DIAGNOSIS — S01.01XA LACERATION WITHOUT FOREIGN BODY OF SCALP, INITIAL ENCOUNTER: ICD-10-CM

## 2024-05-22 DIAGNOSIS — N30.00 ACUTE CYSTITIS WITHOUT HEMATURIA: ICD-10-CM
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
## 2024-05-22 DIAGNOSIS — D64.9 ANEMIA, UNSPECIFIED: ICD-10-CM
